# Patient Record
Sex: FEMALE | Race: BLACK OR AFRICAN AMERICAN | NOT HISPANIC OR LATINO | Employment: FULL TIME | URBAN - METROPOLITAN AREA
[De-identification: names, ages, dates, MRNs, and addresses within clinical notes are randomized per-mention and may not be internally consistent; named-entity substitution may affect disease eponyms.]

---

## 2017-05-27 ENCOUNTER — HOSPITAL ENCOUNTER (EMERGENCY)
Facility: OTHER | Age: 27
Discharge: HOME | End: 2017-05-27
Attending: EMERGENCY MEDICINE
Payer: MEDICAID

## 2017-05-27 VITALS
TEMPERATURE: 98 F | RESPIRATION RATE: 16 BRPM | SYSTOLIC BLOOD PRESSURE: 126 MMHG | OXYGEN SATURATION: 97 % | WEIGHT: 270 LBS | BODY MASS INDEX: 43.39 KG/M2 | DIASTOLIC BLOOD PRESSURE: 79 MMHG | HEIGHT: 66 IN | HEART RATE: 98 BPM

## 2017-05-27 DIAGNOSIS — R05.9 COUGH: ICD-10-CM

## 2017-05-27 DIAGNOSIS — R06.02 SOB (SHORTNESS OF BREATH): ICD-10-CM

## 2017-05-27 DIAGNOSIS — J20.9 ACUTE BRONCHITIS, UNSPECIFIED ORGANISM: Primary | ICD-10-CM

## 2017-05-27 LAB
ALBUMIN SERPL BCP-MCNC: 4.1 G/DL
ALP SERPL-CCNC: 78 U/L
ALT SERPL W/O P-5'-P-CCNC: 12 U/L
ANION GAP SERPL CALC-SCNC: 12 MMOL/L
AST SERPL-CCNC: 17 U/L
B-HCG UR QL: NEGATIVE
BASOPHILS # BLD AUTO: 0.02 K/UL
BASOPHILS NFR BLD: 0.2 %
BILIRUB SERPL-MCNC: 0.4 MG/DL
BUN SERPL-MCNC: 13 MG/DL
CALCIUM SERPL-MCNC: 9.6 MG/DL
CHLORIDE SERPL-SCNC: 105 MMOL/L
CO2 SERPL-SCNC: 24 MMOL/L
CREAT SERPL-MCNC: 0.9 MG/DL
CTP QC/QA: YES
D DIMER PPP IA.FEU-MCNC: 0.19 MG/L FEU
DIFFERENTIAL METHOD: ABNORMAL
EOSINOPHIL # BLD AUTO: 0.4 K/UL
EOSINOPHIL NFR BLD: 3.6 %
ERYTHROCYTE [DISTWIDTH] IN BLOOD BY AUTOMATED COUNT: 13.4 %
EST. GFR  (AFRICAN AMERICAN): >60 ML/MIN/1.73 M^2
EST. GFR  (NON AFRICAN AMERICAN): >60 ML/MIN/1.73 M^2
GLUCOSE SERPL-MCNC: 90 MG/DL
HCT VFR BLD AUTO: 39.1 %
HGB BLD-MCNC: 12.9 G/DL
LYMPHOCYTES # BLD AUTO: 2.5 K/UL
LYMPHOCYTES NFR BLD: 21.3 %
MCH RBC QN AUTO: 28.4 PG
MCHC RBC AUTO-ENTMCNC: 33 %
MCV RBC AUTO: 86 FL
MONOCYTES # BLD AUTO: 0.7 K/UL
MONOCYTES NFR BLD: 5.8 %
NEUTROPHILS # BLD AUTO: 7.9 K/UL
NEUTROPHILS NFR BLD: 68.8 %
PLATELET # BLD AUTO: 273 K/UL
PMV BLD AUTO: 10.7 FL
POTASSIUM SERPL-SCNC: 4 MMOL/L
PROT SERPL-MCNC: 7.7 G/DL
RBC # BLD AUTO: 4.55 M/UL
SODIUM SERPL-SCNC: 141 MMOL/L
WBC # BLD AUTO: 11.53 K/UL

## 2017-05-27 PROCEDURE — 25000242 PHARM REV CODE 250 ALT 637 W/ HCPCS: Performed by: PHYSICIAN ASSISTANT

## 2017-05-27 PROCEDURE — 94640 AIRWAY INHALATION TREATMENT: CPT

## 2017-05-27 PROCEDURE — 96374 THER/PROPH/DIAG INJ IV PUSH: CPT

## 2017-05-27 PROCEDURE — 93005 ELECTROCARDIOGRAM TRACING: CPT

## 2017-05-27 PROCEDURE — 25000003 PHARM REV CODE 250: Performed by: PHYSICIAN ASSISTANT

## 2017-05-27 PROCEDURE — 85025 COMPLETE CBC W/AUTO DIFF WBC: CPT

## 2017-05-27 PROCEDURE — 81025 URINE PREGNANCY TEST: CPT | Performed by: EMERGENCY MEDICINE

## 2017-05-27 PROCEDURE — 96372 THER/PROPH/DIAG INJ SC/IM: CPT

## 2017-05-27 PROCEDURE — 63600175 PHARM REV CODE 636 W HCPCS: Performed by: PHYSICIAN ASSISTANT

## 2017-05-27 PROCEDURE — 85379 FIBRIN DEGRADATION QUANT: CPT

## 2017-05-27 PROCEDURE — 80053 COMPREHEN METABOLIC PANEL: CPT

## 2017-05-27 PROCEDURE — 93010 ELECTROCARDIOGRAM REPORT: CPT | Mod: ,,, | Performed by: INTERNAL MEDICINE

## 2017-05-27 PROCEDURE — 96361 HYDRATE IV INFUSION ADD-ON: CPT

## 2017-05-27 PROCEDURE — 99284 EMERGENCY DEPT VISIT MOD MDM: CPT | Mod: 25

## 2017-05-27 RX ORDER — SODIUM CHLORIDE 9 MG/ML
1000 INJECTION, SOLUTION INTRAVENOUS
Status: COMPLETED | OUTPATIENT
Start: 2017-05-27 | End: 2017-05-27

## 2017-05-27 RX ORDER — IPRATROPIUM BROMIDE AND ALBUTEROL SULFATE 2.5; .5 MG/3ML; MG/3ML
3 SOLUTION RESPIRATORY (INHALATION)
Status: COMPLETED | OUTPATIENT
Start: 2017-05-27 | End: 2017-05-27

## 2017-05-27 RX ORDER — KETOROLAC TROMETHAMINE 30 MG/ML
15 INJECTION, SOLUTION INTRAMUSCULAR; INTRAVENOUS
Status: COMPLETED | OUTPATIENT
Start: 2017-05-27 | End: 2017-05-27

## 2017-05-27 RX ORDER — DEXAMETHASONE SODIUM PHOSPHATE 4 MG/ML
8 INJECTION, SOLUTION INTRA-ARTICULAR; INTRALESIONAL; INTRAMUSCULAR; INTRAVENOUS; SOFT TISSUE
Status: COMPLETED | OUTPATIENT
Start: 2017-05-27 | End: 2017-05-27

## 2017-05-27 RX ORDER — ALBUTEROL SULFATE 90 UG/1
1-2 AEROSOL, METERED RESPIRATORY (INHALATION) EVERY 6 HOURS PRN
Qty: 1 INHALER | Refills: 0 | Status: SHIPPED | OUTPATIENT
Start: 2017-05-27 | End: 2018-05-27

## 2017-05-27 RX ADMIN — IPRATROPIUM BROMIDE AND ALBUTEROL SULFATE 3 ML: .5; 3 SOLUTION RESPIRATORY (INHALATION) at 07:05

## 2017-05-27 RX ADMIN — SODIUM CHLORIDE 1000 ML: 0.9 INJECTION, SOLUTION INTRAVENOUS at 08:05

## 2017-05-27 RX ADMIN — KETOROLAC TROMETHAMINE 15 MG: 30 INJECTION, SOLUTION INTRAMUSCULAR at 08:05

## 2017-05-27 RX ADMIN — SODIUM CHLORIDE 1000 ML: 0.9 INJECTION, SOLUTION INTRAVENOUS at 05:05

## 2017-05-27 RX ADMIN — DEXAMETHASONE SODIUM PHOSPHATE 8 MG: 4 INJECTION, SOLUTION INTRAMUSCULAR; INTRAVENOUS at 07:05

## 2017-05-27 NOTE — ED TRIAGE NOTES
pt reports with c/o SOB for the past 2 days. pt with back pain, cough and chest wall pain with coughing.reports irritation to thoat

## 2017-05-27 NOTE — ED PROVIDER NOTES
Encounter Date: 5/27/2017       History     Chief Complaint   Patient presents with    Shortness of Breath     pt states sob x 2 days. pt with back pain cough and chest wall pain with coughing.     Review of patient's allergies indicates:  No Known Allergies  Patient is 26-year-old female who presents with complaints of shortness of breath and chest tightness that started yesterday and persisted into today.  She reports associated cough without report of nasal congestion or sore throat. She denies fever or chills. She has no report of sick contacts or recent travel. She reports SOB is worse on exertion and with coughing spells. She reports feeling chest tightness with each breath and after coughing. She denies history of asthma. She has not been taking any medications at home to help with symptoms. She is currently unaccompanied in the ER.       The history is provided by the patient.     Past Medical History:   Diagnosis Date    Anemia     Complication of anesthesia     one sided epidural     Past Surgical History:   Procedure Laterality Date    TUBAL LIGATION       Family History   Problem Relation Age of Onset    Hypertension Maternal Grandmother     Hyperlipidemia Maternal Grandmother     Breast cancer Maternal Grandmother      dx in age - unknown    Hypertension Mother     Asthma Sister     Asthma Son     Heart disease Maternal Aunt     Cancer Maternal Uncle     Colon cancer Maternal Uncle     Ovarian cancer Neg Hx      Social History   Substance Use Topics    Smoking status: Never Smoker    Smokeless tobacco: Never Used    Alcohol use 1.2 oz/week     2 Shots of liquor per week      Comment: occasional     Review of Systems   Constitutional: Negative for fever.   HENT: Negative for sore throat.    Respiratory: Positive for cough, chest tightness and shortness of breath.    Cardiovascular: Negative for chest pain.   Gastrointestinal: Negative for nausea.   Genitourinary: Negative for dysuria.    Musculoskeletal: Negative for back pain.   Skin: Negative for rash.   Neurological: Negative for weakness.   Hematological: Does not bruise/bleed easily.       Physical Exam     Initial Vitals [05/27/17 1654]   BP Pulse Resp Temp SpO2   135/85 107 20 97.9 °F (36.6 °C) 96 %     Physical Exam    Nursing note and vitals reviewed.  Constitutional: She appears well-developed and well-nourished. She is not diaphoretic. No distress.   Healthy appearing -American female in no acute distress or obvious pain.  She makes good eye contact, speaks in clear full sentences and ambulates with ease.   HENT:   Head: Normocephalic and atraumatic.   Eyes: Conjunctivae and EOM are normal. Pupils are equal, round, and reactive to light. Right eye exhibits no discharge. Left eye exhibits no discharge. No scleral icterus.   Neck: Normal range of motion. Neck supple.   Cardiovascular: Normal rate, regular rhythm and normal heart sounds.   Pulmonary/Chest: No respiratory distress. She has no wheezes. She has no rhonchi. She has no rales.   Diminished breath sounds bilaterally thought to be related to body habitus.  No audible wheezing rales or rhonchi.   Abdominal: Soft. Bowel sounds are normal. There is no tenderness. There is no rebound and no guarding.   Musculoskeletal: Normal range of motion. She exhibits no edema or tenderness.   Lymphadenopathy:     She has no cervical adenopathy.   Neurological: She is alert and oriented to person, place, and time. She has normal strength. No cranial nerve deficit or sensory deficit.   Skin: Skin is warm and dry. Capillary refill takes less than 2 seconds. No rash and no abscess noted. No erythema.   Psychiatric: She has a normal mood and affect. Her behavior is normal. Thought content normal.         ED Course   Procedures  Labs Reviewed   CBC W/ AUTO DIFFERENTIAL   COMPREHENSIVE METABOLIC PANEL   D DIMER, QUANTITATIVE     EKG Readings: (Independently Interpreted)   Initial Reading: No  STEMI. Rhythm: Normal Sinus Rhythm. Heart Rate: 100. Ectopy: No Ectopy. ST Segments: Normal ST Segments. T Waves: Normal. Axis: Normal.      Imaging Results          X-Ray Chest PA And Lateral (Final result)  Result time 05/27/17 19:13:05    Final result by Jac Holt MD (05/27/17 19:13:05)                 Impression:        No acute cardiothoracic disease evident.       Electronically signed by: Dr. Jac Holt MD  Date:     05/27/17  Time:    19:13              Narrative:    TWO VIEW CHEST:     Comparison: None.    Findings:    The cardiac silhouette and the pulmonary vasculature are normal in size.  The mediastinal and hilar contours are unremarkable.  There is no pneumothorax.  No pleural effusion.  The lungs are clear.  No acute bony abnormality.                            Labs Reviewed   CBC W/ AUTO DIFFERENTIAL - Abnormal; Notable for the following:        Result Value    Gran # 7.9 (*)     All other components within normal limits   COMPREHENSIVE METABOLIC PANEL   D DIMER, QUANTITATIVE   POCT URINE PREGNANCY            Medical Decision Making:   ED Management:  Urgent evaluation of 26-year-old female who presents with complaints of shortness of breath without clear etiology to concerning for pulmonary embolism given Tachycardia.  She is afebrile, nontoxic appearing and tachycardic at 107. Physical exam reveals no adventitious breath sounds however she does have diminished breath sounds diffusely thought to be related to body habitus.  No evidence of HEENT inflammation or bacterial infection.  Chest x-ray pending.  Diagnostic labs including d-dimer pending.  We'll continue to monitor.    UPDATE: patient has no ischemic changes on EKG.  D-dimer is negative.  Chest x-ray is unremarkable.diagnostic labs unremarkable.  Duo neb improves patient's symptoms.  Will discharge with  Albuterol inhaler and encouraged her to follow-up with primary care provider in 1-2 days for symptom recheck.  She verbalizes  understanding is amenable to plan.  Case discussed with attending agrees with plan.    Patient has delay of discharge due to tachycardia on ambulation thought to be a result of duo nebs. She is give a second Liter of NS and has significant improvement in VS with no reported SOB. She is felt safe for discharge as planned. She is amenable.   Other:   I have discussed this case with another health care provider.       <> Summary of the Discussion: [facility]                   ED Course     Clinical Impression:   The primary encounter diagnosis was Acute bronchitis, unspecified organism. Diagnoses of SOB (shortness of breath) and Cough were also pertinent to this visit.          Kenia Wright PA-C  05/27/17 2001       Kenia Wright PA-C  05/27/17 6876

## 2017-05-28 NOTE — ED NOTES
Pt rounding complete. Pain 4/10.  Restroom and comfort needs addressed. Pt updated on plan of care. Will continue to monitor.

## 2017-07-17 ENCOUNTER — HOSPITAL ENCOUNTER (EMERGENCY)
Facility: HOSPITAL | Age: 27
Discharge: HOME | End: 2017-07-17
Attending: EMERGENCY MEDICINE
Payer: MEDICAID

## 2017-07-17 VITALS
RESPIRATION RATE: 16 BRPM | HEART RATE: 80 BPM | BODY MASS INDEX: 43.07 KG/M2 | TEMPERATURE: 98 F | WEIGHT: 268 LBS | DIASTOLIC BLOOD PRESSURE: 72 MMHG | OXYGEN SATURATION: 100 % | HEIGHT: 66 IN | SYSTOLIC BLOOD PRESSURE: 128 MMHG

## 2017-07-17 DIAGNOSIS — R06.09 DOE (DYSPNEA ON EXERTION): ICD-10-CM

## 2017-07-17 DIAGNOSIS — R07.9 CHEST PAIN: ICD-10-CM

## 2017-07-17 DIAGNOSIS — R07.89 CHEST TIGHTNESS: Primary | ICD-10-CM

## 2017-07-17 LAB
ALBUMIN SERPL BCP-MCNC: 4.3 G/DL
ALP SERPL-CCNC: 89 U/L
ALT SERPL W/O P-5'-P-CCNC: 12 U/L
ANION GAP SERPL CALC-SCNC: 10 MMOL/L
AST SERPL-CCNC: 22 U/L
B-HCG UR QL: NEGATIVE
BASOPHILS # BLD AUTO: 0.02 K/UL
BASOPHILS NFR BLD: 0.2 %
BILIRUB SERPL-MCNC: 0.6 MG/DL
BNP SERPL-MCNC: <10 PG/ML
BUN SERPL-MCNC: 12 MG/DL
CALCIUM SERPL-MCNC: 8.7 MG/DL
CHLORIDE SERPL-SCNC: 108 MMOL/L
CO2 SERPL-SCNC: 22 MMOL/L
CREAT SERPL-MCNC: 0.9 MG/DL
CTP QC/QA: YES
DIFFERENTIAL METHOD: NORMAL
EOSINOPHIL # BLD AUTO: 0.2 K/UL
EOSINOPHIL NFR BLD: 2.1 %
ERYTHROCYTE [DISTWIDTH] IN BLOOD BY AUTOMATED COUNT: 13.6 %
EST. GFR  (AFRICAN AMERICAN): >60 ML/MIN/1.73 M^2
EST. GFR  (NON AFRICAN AMERICAN): >60 ML/MIN/1.73 M^2
GLUCOSE SERPL-MCNC: 85 MG/DL
HCT VFR BLD AUTO: 40.2 %
HGB BLD-MCNC: 13.8 G/DL
LYMPHOCYTES # BLD AUTO: 3 K/UL
LYMPHOCYTES NFR BLD: 26.1 %
MCH RBC QN AUTO: 29.6 PG
MCHC RBC AUTO-ENTMCNC: 34.3 %
MCV RBC AUTO: 86 FL
MONOCYTES # BLD AUTO: 0.8 K/UL
MONOCYTES NFR BLD: 7 %
NEUTROPHILS # BLD AUTO: 7.3 K/UL
NEUTROPHILS NFR BLD: 64.4 %
PLATELET # BLD AUTO: 270 K/UL
PMV BLD AUTO: 10.2 FL
POTASSIUM SERPL-SCNC: 3.7 MMOL/L
PROT SERPL-MCNC: 8.1 G/DL
RBC # BLD AUTO: 4.66 M/UL
SODIUM SERPL-SCNC: 140 MMOL/L
TROPONIN I SERPL DL<=0.01 NG/ML-MCNC: <0.006 NG/ML
WBC # BLD AUTO: 11.38 K/UL

## 2017-07-17 PROCEDURE — 83880 ASSAY OF NATRIURETIC PEPTIDE: CPT

## 2017-07-17 PROCEDURE — 93005 ELECTROCARDIOGRAM TRACING: CPT

## 2017-07-17 PROCEDURE — 99284 EMERGENCY DEPT VISIT MOD MDM: CPT | Mod: 25

## 2017-07-17 PROCEDURE — 84484 ASSAY OF TROPONIN QUANT: CPT

## 2017-07-17 PROCEDURE — 99284 EMERGENCY DEPT VISIT MOD MDM: CPT | Mod: ,,, | Performed by: PHYSICIAN ASSISTANT

## 2017-07-17 PROCEDURE — 94760 N-INVAS EAR/PLS OXIMETRY 1: CPT

## 2017-07-17 PROCEDURE — 94640 AIRWAY INHALATION TREATMENT: CPT

## 2017-07-17 PROCEDURE — 93010 ELECTROCARDIOGRAM REPORT: CPT | Mod: ,,, | Performed by: INTERNAL MEDICINE

## 2017-07-17 PROCEDURE — 85025 COMPLETE CBC W/AUTO DIFF WBC: CPT

## 2017-07-17 PROCEDURE — 80053 COMPREHEN METABOLIC PANEL: CPT

## 2017-07-17 PROCEDURE — 25000242 PHARM REV CODE 250 ALT 637 W/ HCPCS: Performed by: PHYSICIAN ASSISTANT

## 2017-07-17 PROCEDURE — 81025 URINE PREGNANCY TEST: CPT | Performed by: PHYSICIAN ASSISTANT

## 2017-07-17 RX ORDER — IPRATROPIUM BROMIDE AND ALBUTEROL SULFATE 2.5; .5 MG/3ML; MG/3ML
3 SOLUTION RESPIRATORY (INHALATION)
Status: COMPLETED | OUTPATIENT
Start: 2017-07-17 | End: 2017-07-17

## 2017-07-17 RX ADMIN — IPRATROPIUM BROMIDE AND ALBUTEROL SULFATE 3 ML: .5; 3 SOLUTION RESPIRATORY (INHALATION) at 09:07

## 2017-07-17 NOTE — ED PROVIDER NOTES
Encounter Date: 7/17/2017    SCRIBE #1 NOTE: I, Laure Bueno, am scribing for, and in the presence of, Yaritza Carr PA-C.   SCRIBE #2 NOTE: I, Adama Sam, am scribing for, and in the presence of,  Dr. Cloud. I have scribed the following portions of the note - the APC attestation.     History     Chief Complaint   Patient presents with    Multiple Complaints     chest pain, back pain sob, just came on while working, denies cardiac hx,had cold 2 days ago     Time seen by provider: 6:33 PM    This is a 26 y.o. female with a PMHx of anemia with who presents with complaint of intermittent left sided parasternal chest pain/tightness for 2 days. Pain began at work (pt works at [employer]). This pain is worse with coughing and deep breaths. The patient also describes associated mid upper back tightness which onset around the same time. She also complains of 3 months of dyspnea on exertion and orthopnea which inhalers have not improved. She was diagnosed with bronchitis about a month ago but has not had improvement with treatment. She also has 2 weeks of lower extremity pain and ankle and feet swelling. She notices this most when she has been on her feet for a while and mostly resolves with elevation. The patient denies being on hormonal birthcontrol and has no history of blood clots. The patient also endorses that she is just getting over a cold and had one episode of vomiting last night. She denies any fever, chills, diarrhea, abdominal pain, and dysuria.       The history is provided by the patient.     Review of patient's allergies indicates:  No Known Allergies  Past Medical History:   Diagnosis Date    Anemia     Complication of anesthesia     one sided epidural     Past Surgical History:   Procedure Laterality Date    TUBAL LIGATION       Family History   Problem Relation Age of Onset    Hypertension Maternal Grandmother     Hyperlipidemia Maternal Grandmother     Breast cancer Maternal Grandmother      dx in  age - unknown    Hypertension Mother     Asthma Sister     Asthma Son     Heart disease Maternal Aunt     Cancer Maternal Uncle     Colon cancer Maternal Uncle     Ovarian cancer Neg Hx      Social History   Substance Use Topics    Smoking status: Never Smoker    Smokeless tobacco: Never Used    Alcohol use 1.2 oz/week     2 Shots of liquor per week      Comment: occasional     Review of Systems   Constitutional: Negative for chills and fever.   HENT: Negative for facial swelling and nosebleeds.    Eyes: Negative for visual disturbance.   Respiratory: Positive for chest tightness and shortness of breath.    Cardiovascular: Positive for chest pain. Negative for palpitations.   Gastrointestinal: Positive for vomiting. Negative for abdominal distention, abdominal pain and diarrhea.   Genitourinary: Negative for difficulty urinating, dysuria, frequency and hematuria.   Musculoskeletal: Positive for back pain, joint swelling and myalgias. Negative for neck pain and neck stiffness.   Skin: Negative for rash.   Neurological: Negative for seizures, syncope and speech difficulty.       Physical Exam     Initial Vitals [07/17/17 1746]   BP Pulse Resp Temp SpO2   125/75 95 18 98 °F (36.7 °C) 96 %      MAP       91.67         Physical Exam    Nursing note and vitals reviewed.  Constitutional: She appears well-developed and well-nourished. She is not diaphoretic. She is Obese .  Non-toxic appearance. She does not appear ill. No distress.   HENT:   Head: Normocephalic and atraumatic.   Neck: Neck supple.   Cardiovascular: Normal rate and regular rhythm. Exam reveals no gallop and no friction rub.    No murmur heard.  No lower extremity edema, no reproducible chest tenderness.    Pulmonary/Chest: Effort normal. No accessory muscle usage. No tachypnea. No respiratory distress. She has decreased breath sounds (throughout). She has no wheezes. She has no rhonchi. She has no rales.   Abdominal: Soft. Normal appearance and  bowel sounds are normal. She exhibits no distension. There is no tenderness.   Musculoskeletal: Normal range of motion.   Neurological: She is alert and oriented to person, place, and time.   Skin: Skin is warm and dry. No rash noted. No pallor.   Psychiatric: She has a normal mood and affect. Her behavior is normal. Judgment and thought content normal.         ED Course   Procedures  Labs Reviewed   COMPREHENSIVE METABOLIC PANEL - Abnormal; Notable for the following:        Result Value    CO2 22 (*)     All other components within normal limits   CBC W/ AUTO DIFFERENTIAL   TROPONIN I   B-TYPE NATRIURETIC PEPTIDE   POCT URINE PREGNANCY             Medical Decision Making:   History:   Old Medical Records: I decided to obtain old medical records.  Differential Diagnosis:   My initial differential diagnoses include, but are not limited to: pneumonia, ACS, CHF, pulmonary embolism, GERD.   Clinical Tests:   Lab Tests: Ordered and Reviewed  Radiological Study: Ordered and Reviewed  Medical Tests: Ordered and Reviewed       APC / Resident Notes:   26 y.o. female present for evaluation of chest tightness, dyspnea on exertion, ble edema. Vitals are within normal limits. Lungs have decreased breath sounds throughout. No peripheral edema. Cardiac exam unremarkable.     workup including cardiac enzymes, CXR, and EKG are all unremarkable for acute process.     Will discharge patient is stable condition with a respiratory spacer for her home albuterol. PCP follow up in 3-4 days for reevaluation.     ED warnings and return precautions given.  I have reviewed the patient's records and discussed this case with my supervising physician.        Scribe Attestation:   Scribe #1: I performed the above scribed service and the documentation accurately describes the services I performed. I attest to the accuracy of the note.  Scribe #2: I performed the above scribed service and the documentation accurately describes the services I  performed. I attest to the accuracy of the note.    Attending Attestation:     Physician Attestation Statement for NP/PA:   I discussed this assessment and plan of this patient with the NP/PA, but I did not personally examine the patient. The face to face encounter was performed by the NP/PA.    Other NP/PA Attestation Additions:    History of Present Illness: Shortness of breath and chest tightness          Physician Attestation for Scribe:  Physician Attestation Statement for Scribe #1: I, Yaritza Carr PA-C, reviewed documentation, as scribed by Laure Bueno in my presence, and it is both accurate and complete.   Physician Attestation Statement for Scribe #2: I, Dr. Cloud , reviewed documentation, as scribed by Adama Sam in my presence, and it is both accurate and complete. I also acknowledge and confirm the content of the note done by Ada #1.              ED Course     Clinical Impression:   The primary encounter diagnosis was Chest tightness. Diagnoses of Chest pain and BROWN (dyspnea on exertion) were also pertinent to this visit.    Disposition:   Disposition: Discharged  Condition: Stable                        Yaritza Carr PA-C  07/18/17 0229

## 2017-07-17 NOTE — ED TRIAGE NOTES
"Pt c/o chest pain onset x2 days ago, located to center of chest, describes as "intermittent tightness", denies at this time. Pt also c/o SOB onset today and mid upper back pain onset approx x3 days ago. Rates pain 4/10 at this time. Denies N/V. Pt also c/o bilateral lower extremity swelling, intermittently, x2-3 weeks.    "

## 2017-07-17 NOTE — PROVIDER PROGRESS NOTES - EMERGENCY DEPT.
Encounter Date: 7/17/2017    ED Physician Progress Notes         EKG - STEMI Decision  Initial Reading: No STEMI present.  Response: No Action Needed.

## 2017-08-07 ENCOUNTER — HOSPITAL ENCOUNTER (EMERGENCY)
Facility: HOSPITAL | Age: 27
Discharge: HOME | End: 2017-08-07
Attending: EMERGENCY MEDICINE | Admitting: EMERGENCY MEDICINE
Payer: MEDICAID

## 2017-08-07 VITALS
HEART RATE: 83 BPM | BODY MASS INDEX: 44.68 KG/M2 | RESPIRATION RATE: 16 BRPM | DIASTOLIC BLOOD PRESSURE: 71 MMHG | WEIGHT: 278 LBS | SYSTOLIC BLOOD PRESSURE: 122 MMHG | OXYGEN SATURATION: 100 % | TEMPERATURE: 99 F | HEIGHT: 66 IN

## 2017-08-07 DIAGNOSIS — M25.561 ACUTE PAIN OF RIGHT KNEE: Primary | ICD-10-CM

## 2017-08-07 LAB
B-HCG UR QL: NEGATIVE
CTP QC/QA: YES

## 2017-08-07 PROCEDURE — 81025 URINE PREGNANCY TEST: CPT | Performed by: EMERGENCY MEDICINE

## 2017-08-07 PROCEDURE — 99282 EMERGENCY DEPT VISIT SF MDM: CPT | Mod: ,,, | Performed by: PHYSICIAN ASSISTANT

## 2017-08-07 PROCEDURE — 99283 EMERGENCY DEPT VISIT LOW MDM: CPT

## 2017-08-07 RX ORDER — IBUPROFEN 600 MG/1
600 TABLET ORAL EVERY 6 HOURS PRN
Qty: 20 TABLET | Refills: 0 | OUTPATIENT
Start: 2017-08-07 | End: 2019-04-14

## 2017-08-07 RX ORDER — FLUTICASONE FUROATE AND VILANTEROL 200; 25 UG/1; UG/1
1 POWDER RESPIRATORY (INHALATION) DAILY
COMMUNITY
End: 2021-11-16 | Stop reason: ALTCHOICE

## 2017-08-07 NOTE — ED PROVIDER NOTES
Encounter Date: 8/7/2017    SCRIBE #1 NOTE: IDylan, am scribing for, and in the presence of, Yaritza Carr PA-C.       History     Chief Complaint   Patient presents with    Knee Pain     bad pain to r knee, hx ligament tear     Pt is a 26 y.o. female who has a pmhx of anemia; arthritis; is presenting to ED for evaluation of right knee pain that began two days ago.  She also reports some swelling to the right knee.  Patient denies injury.  Pt has not attempted to treat her pain PTA. She reports a tear in right knee ligament one year ago.       The history is provided by the patient.     Review of patient's allergies indicates:  No Known Allergies  Past Medical History:   Diagnosis Date    Anemia     Arthritis     Complication of anesthesia     one sided epidural     Past Surgical History:   Procedure Laterality Date    TUBAL LIGATION       Family History   Problem Relation Age of Onset    Hypertension Maternal Grandmother     Hyperlipidemia Maternal Grandmother     Breast cancer Maternal Grandmother      dx in age - unknown    Hypertension Mother     Asthma Sister     Asthma Son     Heart disease Maternal Aunt     Cancer Maternal Uncle     Colon cancer Maternal Uncle     Ovarian cancer Neg Hx      Social History   Substance Use Topics    Smoking status: Never Smoker    Smokeless tobacco: Never Used    Alcohol use 1.2 oz/week     2 Shots of liquor per week      Comment: occasional     Review of Systems   Constitutional: Negative for fever.   HENT: Negative for sore throat.    Respiratory: Negative for shortness of breath.    Cardiovascular: Negative for chest pain.   Gastrointestinal: Negative for nausea.   Genitourinary: Negative for dysuria.   Musculoskeletal: Positive for arthralgias (R knee). Negative for back pain and myalgias.   Skin: Negative for rash.   Neurological: Negative for weakness.   Hematological: Does not bruise/bleed easily.       Physical Exam     Initial Vitals [08/07/17  1035]   BP Pulse Resp Temp SpO2   122/71 83 16 98.6 °F (37 °C) 100 %      MAP       88         Physical Exam    Nursing note and vitals reviewed.  Constitutional: She appears well-developed and well-nourished. She is not diaphoretic. She is Obese .  Non-toxic appearance. She does not appear ill. No distress.   HENT:   Head: Normocephalic and atraumatic.   Neck: Neck supple.   Cardiovascular: Normal rate and regular rhythm. Exam reveals no gallop and no friction rub.    No murmur heard.  Pulses:       Dorsalis pedis pulses are 2+ on the left side.   Pulmonary/Chest: Effort normal and breath sounds normal. No accessory muscle usage. No tachypnea. No respiratory distress. She has no decreased breath sounds. She has no wheezes. She has no rhonchi. She has no rales.   Abdominal: Normal appearance. She exhibits no distension.   Musculoskeletal: Normal range of motion.   No significant swelling noted to right knee. No joint laxity. Normal passive and active ROM. No significant tenderness.    Neurological: She is alert and oriented to person, place, and time.   Normal sensations to RLE.   Skin: Skin is warm and dry. No rash noted. No pallor.   No warmth or erythema noted.    Psychiatric: She has a normal mood and affect. Her behavior is normal. Judgment and thought content normal.         ED Course   Procedures  Labs Reviewed   POCT URINE PREGNANCY             Medical Decision Making:   Differential Diagnosis:   My differential diagnosis includes but is not limited to; septic joint, gout, arthritis, and baker's cyst.        APC / Resident Notes:   This is an emergent evaluation of an 26 y.o. female presenting to ED for evaluation of right knee pain x2 days. Vitals are normal.  Physical exam findings as above.       I do not feel that imaging is indicated at this time. I will discharge with NSAIDs and instructions for RICE therapy. PCP follow in 3-4 days if no improvement. ED warnings and return precautions given. I have  reviewed pt's records and discussed with supervising physician.        Scribe Attestation:   Scribe #1: I performed the above scribed service and the documentation accurately describes the services I performed. I attest to the accuracy of the note.    Attending Attestation:           Physician Attestation for Scribe:  Physician Attestation Statement for Scribe #1: I, Yaritza Carr PA-C, reviewed documentation, as scribed by Dylan Logan in my presence, and it is both accurate and complete.                 ED Course     Clinical Impression:     1. Acute pain of right knee          Disposition:   Disposition: Discharged  Condition: Stable                        Yaritza Carr PA-C  08/07/17 3295

## 2017-08-07 NOTE — ED TRIAGE NOTES
Pt c/o right knee pain onset yesterday - rates pain 6/10 at this time - denies recent injury - reports hx of ligament tear to knee. Denies numbness or tingling at this time.

## 2018-05-11 ENCOUNTER — HOSPITAL ENCOUNTER (EMERGENCY)
Facility: HOSPITAL | Age: 28
Discharge: HOME | End: 2018-05-11
Attending: EMERGENCY MEDICINE
Payer: MEDICAID

## 2018-05-11 VITALS
OXYGEN SATURATION: 100 % | TEMPERATURE: 98 F | HEART RATE: 80 BPM | HEIGHT: 66 IN | WEIGHT: 279 LBS | DIASTOLIC BLOOD PRESSURE: 80 MMHG | BODY MASS INDEX: 44.84 KG/M2 | RESPIRATION RATE: 18 BRPM | SYSTOLIC BLOOD PRESSURE: 140 MMHG

## 2018-05-11 DIAGNOSIS — K52.9 GASTROENTERITIS: Primary | ICD-10-CM

## 2018-05-11 LAB
ALBUMIN SERPL BCP-MCNC: 4.1 G/DL
ALP SERPL-CCNC: 81 U/L
ALT SERPL W/O P-5'-P-CCNC: 11 U/L
ANION GAP SERPL CALC-SCNC: 11 MMOL/L
AST SERPL-CCNC: 16 U/L
BACTERIA #/AREA URNS AUTO: NORMAL /HPF
BASOPHILS # BLD AUTO: 0.04 K/UL
BASOPHILS NFR BLD: 0.4 %
BILIRUB SERPL-MCNC: 0.6 MG/DL
BILIRUB UR QL STRIP: NEGATIVE
BUN SERPL-MCNC: 11 MG/DL
CALCIUM SERPL-MCNC: 9 MG/DL
CHLORIDE SERPL-SCNC: 108 MMOL/L
CLARITY UR REFRACT.AUTO: CLEAR
CO2 SERPL-SCNC: 23 MMOL/L
COLOR UR AUTO: YELLOW
CREAT SERPL-MCNC: 0.8 MG/DL
DIFFERENTIAL METHOD: ABNORMAL
EOSINOPHIL # BLD AUTO: 0.3 K/UL
EOSINOPHIL NFR BLD: 2.4 %
ERYTHROCYTE [DISTWIDTH] IN BLOOD BY AUTOMATED COUNT: 13 %
EST. GFR  (AFRICAN AMERICAN): >60 ML/MIN/1.73 M^2
EST. GFR  (NON AFRICAN AMERICAN): >60 ML/MIN/1.73 M^2
GLUCOSE SERPL-MCNC: 100 MG/DL
GLUCOSE UR QL STRIP: NEGATIVE
HCT VFR BLD AUTO: 37.5 %
HGB BLD-MCNC: 12.7 G/DL
HGB UR QL STRIP: NEGATIVE
HYALINE CASTS UR QL AUTO: 0 /LPF
IMM GRANULOCYTES # BLD AUTO: 0.06 K/UL
IMM GRANULOCYTES NFR BLD AUTO: 0.5 %
KETONES UR QL STRIP: NEGATIVE
LEUKOCYTE ESTERASE UR QL STRIP: NEGATIVE
LIPASE SERPL-CCNC: 12 U/L
LYMPHOCYTES # BLD AUTO: 1.5 K/UL
LYMPHOCYTES NFR BLD: 13.6 %
MCH RBC QN AUTO: 29.5 PG
MCHC RBC AUTO-ENTMCNC: 33.9 G/DL
MCV RBC AUTO: 87 FL
MICROSCOPIC COMMENT: NORMAL
MONOCYTES # BLD AUTO: 0.7 K/UL
MONOCYTES NFR BLD: 6.3 %
NEUTROPHILS # BLD AUTO: 8.4 K/UL
NEUTROPHILS NFR BLD: 76.8 %
NITRITE UR QL STRIP: NEGATIVE
NRBC BLD-RTO: 0 /100 WBC
PH UR STRIP: 6 [PH] (ref 5–8)
PLATELET # BLD AUTO: 242 K/UL
PMV BLD AUTO: 11.1 FL
POTASSIUM SERPL-SCNC: 4.1 MMOL/L
PROT SERPL-MCNC: 7.9 G/DL
PROT UR QL STRIP: ABNORMAL
RBC # BLD AUTO: 4.3 M/UL
RBC #/AREA URNS AUTO: 1 /HPF (ref 0–4)
SODIUM SERPL-SCNC: 142 MMOL/L
SP GR UR STRIP: 1.02 (ref 1–1.03)
SQUAMOUS #/AREA URNS AUTO: 3 /HPF
URN SPEC COLLECT METH UR: ABNORMAL
UROBILINOGEN UR STRIP-ACNC: NEGATIVE EU/DL
WBC # BLD AUTO: 10.95 K/UL
WBC #/AREA URNS AUTO: 2 /HPF (ref 0–5)

## 2018-05-11 PROCEDURE — 96365 THER/PROPH/DIAG IV INF INIT: CPT | Mod: 59

## 2018-05-11 PROCEDURE — 96375 TX/PRO/DX INJ NEW DRUG ADDON: CPT

## 2018-05-11 PROCEDURE — 99283 EMERGENCY DEPT VISIT LOW MDM: CPT | Mod: ,,, | Performed by: EMERGENCY MEDICINE

## 2018-05-11 PROCEDURE — 96361 HYDRATE IV INFUSION ADD-ON: CPT

## 2018-05-11 PROCEDURE — 85025 COMPLETE CBC W/AUTO DIFF WBC: CPT

## 2018-05-11 PROCEDURE — 63600175 PHARM REV CODE 636 W HCPCS: Performed by: PHYSICIAN ASSISTANT

## 2018-05-11 PROCEDURE — 99284 EMERGENCY DEPT VISIT MOD MDM: CPT | Mod: 25

## 2018-05-11 PROCEDURE — 25000003 PHARM REV CODE 250: Performed by: PHYSICIAN ASSISTANT

## 2018-05-11 PROCEDURE — 81001 URINALYSIS AUTO W/SCOPE: CPT

## 2018-05-11 PROCEDURE — 80053 COMPREHEN METABOLIC PANEL: CPT

## 2018-05-11 PROCEDURE — 96372 THER/PROPH/DIAG INJ SC/IM: CPT | Mod: 59

## 2018-05-11 PROCEDURE — 25500020 PHARM REV CODE 255: Performed by: EMERGENCY MEDICINE

## 2018-05-11 PROCEDURE — 83690 ASSAY OF LIPASE: CPT

## 2018-05-11 RX ORDER — DICYCLOMINE HYDROCHLORIDE 10 MG/ML
20 INJECTION INTRAMUSCULAR
Status: COMPLETED | OUTPATIENT
Start: 2018-05-11 | End: 2018-05-11

## 2018-05-11 RX ORDER — METOCLOPRAMIDE HYDROCHLORIDE 5 MG/ML
10 INJECTION INTRAMUSCULAR; INTRAVENOUS
Status: COMPLETED | OUTPATIENT
Start: 2018-05-11 | End: 2018-05-11

## 2018-05-11 RX ORDER — PROMETHAZINE HYDROCHLORIDE 25 MG/1
25 TABLET ORAL EVERY 6 HOURS PRN
Qty: 10 TABLET | Refills: 0 | Status: SHIPPED | OUTPATIENT
Start: 2018-05-11 | End: 2021-01-19

## 2018-05-11 RX ADMIN — SODIUM CHLORIDE 1000 ML: 0.9 INJECTION, SOLUTION INTRAVENOUS at 05:05

## 2018-05-11 RX ADMIN — METOCLOPRAMIDE 10 MG: 5 INJECTION, SOLUTION INTRAMUSCULAR; INTRAVENOUS at 05:05

## 2018-05-11 RX ADMIN — IOHEXOL 100 ML: 350 INJECTION, SOLUTION INTRAVENOUS at 08:05

## 2018-05-11 RX ADMIN — PROMETHAZINE HYDROCHLORIDE 12.5 MG: 25 INJECTION INTRAMUSCULAR; INTRAVENOUS at 07:05

## 2018-05-11 RX ADMIN — DICYCLOMINE HYDROCHLORIDE 20 MG: 20 INJECTION, SOLUTION INTRAMUSCULAR at 05:05

## 2018-05-11 NOTE — ED NOTES
"Pt identifiers checked and accurate with Rachael Moeller.     Pt reports emesis beginning today. Pt reports abdominal pain with cramps, fatigue and nausea. Pt reports waking up this morning "feeling like I caught a cold". Pt denies dizziness, fever, diarrhea, CP and SOB.     LOC: The patient is awake, alert and aware of environment with an appropriate affect, the patient is oriented x 3 and speaking appropriately.  APPEARANCE: Patient resting comfortably and in no acute distress, patient is clean and well groomed  SKIN: The skin is warm and dry, color consistent with ethnicity, patient has normal skin turgor and moist mucus membranes  RESPIRATORY: Airway is open and patent, breath sounds clear throughout all lung fields; Pt denies SOB  ABDOMEN: Soft and non tender to palpation, no distention noted. Bowel sounds present x 4  NEUROLOGIC: PERRL, 3 mm bilaterally, eyes open spontaneously, behavior appropriate to situation, follows commands    "

## 2018-05-11 NOTE — ED PROVIDER NOTES
Encounter Date: 5/11/2018       History     Chief Complaint   Patient presents with    Emesis     upper R abd     27-year-old female with no relevant medical history presents for nausea and vomiting since waking this morning.  Reports associated intermittent abdominal pain on her left and right sides lasting 2-3 minutes then abating.  Also reporting diarrhea and chills without fever.  Denies bloody or dark stool, chest pain, shortness of breath, urinary symptoms, vaginal discharge, back pain. Denies any new foods, sick contacts, international travel.          Review of patient's allergies indicates:  No Known Allergies  Past Medical History:   Diagnosis Date    Anemia     Arthritis     Complication of anesthesia     one sided epidural     Past Surgical History:   Procedure Laterality Date    TUBAL LIGATION       Family History   Problem Relation Age of Onset    Hypertension Maternal Grandmother     Hyperlipidemia Maternal Grandmother     Breast cancer Maternal Grandmother         dx in age - unknown    Hypertension Mother     Asthma Sister     Asthma Son     Heart disease Maternal Aunt     Cancer Maternal Uncle     Colon cancer Maternal Uncle     Ovarian cancer Neg Hx      Social History   Substance Use Topics    Smoking status: Never Smoker    Smokeless tobacco: Never Used    Alcohol use 1.2 oz/week     2 Shots of liquor per week      Comment: occasional     Review of Systems   Constitutional: Positive for appetite change, chills and fatigue. Negative for fever.   HENT: Positive for rhinorrhea.    Eyes: Negative for visual disturbance.   Respiratory: Negative for cough and shortness of breath.    Cardiovascular: Negative for chest pain and palpitations.   Gastrointestinal: Positive for abdominal pain, diarrhea, nausea and vomiting. Negative for blood in stool and constipation.   Endocrine: Negative for polyuria.   Genitourinary: Negative for difficulty urinating, dysuria, flank pain, hematuria,  menstrual problem, pelvic pain and vaginal discharge.   Musculoskeletal: Negative for back pain and myalgias.   Skin: Negative for rash.   Allergic/Immunologic: Negative for immunocompromised state.   Neurological: Positive for headaches. Negative for light-headedness.       Physical Exam     Initial Vitals [05/11/18 1635]   BP Pulse Resp Temp SpO2   (!) 160/67 86 18 99 °F (37.2 °C) 96 %      MAP       98         Physical Exam    Nursing note and vitals reviewed.  Constitutional: She appears well-developed and well-nourished. She is not diaphoretic. She appears distressed.   Emesis bag in hand with clear yellowish fluid    HENT:   Head: Normocephalic and atraumatic.   Eyes: EOM are normal. Pupils are equal, round, and reactive to light. No scleral icterus.   Neck: Normal range of motion. Neck supple.   Cardiovascular: Normal rate, regular rhythm, normal heart sounds and intact distal pulses. Exam reveals no gallop and no friction rub.    No murmur heard.  Pulmonary/Chest: Breath sounds normal. No respiratory distress. She has no wheezes. She has no rhonchi. She has no rales. She exhibits no tenderness.   Abdominal: Soft. Bowel sounds are normal. She exhibits no distension and no mass. There is tenderness. There is no rebound and no guarding.   Mild diffuse tenderness to palpation   Musculoskeletal: Normal range of motion. She exhibits no edema or tenderness.   No CVA tenderness   Neurological: She is alert and oriented to person, place, and time.   Skin: Skin is warm and dry.         ED Course   Procedures  Labs Reviewed   CBC W/ AUTO DIFFERENTIAL   COMPREHENSIVE METABOLIC PANEL   LIPASE   URINALYSIS, REFLEX TO URINE CULTURE   POCT URINE PREGNANCY     Imaging Results          CT Abdomen Pelvis With Contrast (Final result)  Result time 05/11/18 20:54:10    Final result by Jameel Silva MD (05/11/18 20:54:10)                 Impression:      The appendix is not seen.  No pericecal phlegmon or abscess.  A few  small lymph nodes in the right lower quadrant mesentery.    Nonobstructing 3 mm calculus lower pole right kidney.    2.6 cm right adnexal cyst.    Mild hepatomegaly.      Electronically signed by: Jameel Silva MD  Date:    05/11/2018  Time:    20:54             Narrative:    EXAMINATION:  CT ABDOMEN PELVIS WITH CONTRAST    CLINICAL HISTORY:  epigastric pain + nausea/ vomiting;    TECHNIQUE:  Low dose axial images, sagittal and coronal reformations were obtained from the lung bases to the pubic symphysis following the IV administration of 100 mL of Omnipaque 350 .  Oral contrast was not administered.    COMPARISON:  None.    FINDINGS:  ABDOMEN:    - Lung bases: No infiltrates and no nodules.    - Liver: Mild hepatomegaly.  No focal mass.    - Gallbladder: No calcified gallstones.    - Bile Ducts: No evidence of intra or extra hepatic biliary ductal dilation.    - Spleen: Negative.    - Kidneys: No mass or hydronephrosis.  Nonobstructing 3 mm calculus lower pole right kidney.    - Adrenals: Unremarkable.    - Pancreas: No mass or peripancreatic fat stranding.    - Retroperitoneum:  No significant adenopathy.    - Vascular: No abdominal aortic aneurysm.    - Abdominal wall:  Small fat containing umbilical hernia.    PELVIS:    No pelvic mass, adenopathy, or free fluid.  2.6 cm right adnexal cyst.    BOWEL/MESENTERY:    No evidence of bowel obstruction or inflammation.  The appendix is not seen.  No pericecal phlegmon or abscess.  A few small lymph nodes in the right lower quadrant mesentery.    BONES:  No acute osseous abnormality and no suspicious lytic or blastic lesion.                                        Medical Decision Making:   History:   Old Medical Records: I decided to obtain old medical records.       APC / Resident Notes:   27-year-old female presents with nausea, vomiting, crampy abdominal pain and diarrhea.  Vitals stable on exam with mild, diffuse abdominal tenderness. Ddx includes gastroenteritis,  gastritis, food intolerance.  Will check labs, give Reglan for nausea fluids, Bentyl for pain and reassess.    Patient still vomiting actively despite Reglan.  Repeat abdominal exam with increased tenderness. Will give Phenergan and do abdominal CT.    Labs unremarkable, CT without acute process.  Patient feeling much better, no longer nauseous or vomiting. Able to tolerate p.o. Intake.  Given symptomatic improvement and negative workup, I do not believe she requires further ED treatment and is stable for discharge. Discussed the importance of follow-up as well as ED return precautions.  Patient voiced understanding and is comfortable discharge. Will discharge with Rx for Phenergan for nausea.  I discussed this patient my supervising physician who also examined the patient.    Philly Jernigan PA-C                   Clinical Impression:   The encounter diagnosis was Gastroenteritis.       I have reviewed and concur with the PA's history, physical, assessment, and plan.  I have personally interviewed and examined the patient at bedside.    Gloria Borja MD  05/12/18 0111

## 2018-05-12 NOTE — DISCHARGE INSTRUCTIONS
Try eating food that is easy to digest such as bread, bananas, apples, rice. Avoid greasy or spicy food and food with lots of dairy. Drink plenty of fluids. If you are unable to keep any food down, if you spike a high fever or have blood in your stool you should come back to the emergency department. Please schedule an appointment with your Primary Care Doctor for follow up.

## 2018-10-02 ENCOUNTER — HOSPITAL ENCOUNTER (EMERGENCY)
Facility: HOSPITAL | Age: 28
Discharge: HOME | End: 2018-10-03
Attending: EMERGENCY MEDICINE
Payer: MEDICAID

## 2018-10-02 DIAGNOSIS — J06.9 ACUTE URI: Primary | ICD-10-CM

## 2018-10-02 LAB
B-HCG UR QL: NEGATIVE
CTP QC/QA: YES
FLUAV AG NPH QL: NEGATIVE
FLUBV AG NPH QL: NEGATIVE
S PYO RRNA THROAT QL PROBE: NEGATIVE

## 2018-10-02 PROCEDURE — 81025 URINE PREGNANCY TEST: CPT | Performed by: EMERGENCY MEDICINE

## 2018-10-02 PROCEDURE — 87804 INFLUENZA ASSAY W/OPTIC: CPT

## 2018-10-02 PROCEDURE — 87070 CULTURE OTHR SPECIMN AEROBIC: CPT

## 2018-10-02 PROCEDURE — 87880 STREP A ASSAY W/OPTIC: CPT

## 2018-10-02 PROCEDURE — 99282 EMERGENCY DEPT VISIT SF MDM: CPT | Mod: 25

## 2018-10-03 VITALS
OXYGEN SATURATION: 99 % | HEIGHT: 65 IN | DIASTOLIC BLOOD PRESSURE: 74 MMHG | TEMPERATURE: 99 F | RESPIRATION RATE: 18 BRPM | WEIGHT: 260 LBS | HEART RATE: 88 BPM | BODY MASS INDEX: 43.32 KG/M2 | SYSTOLIC BLOOD PRESSURE: 126 MMHG

## 2018-10-03 RX ORDER — METHYLPREDNISOLONE 4 MG/1
TABLET ORAL
Qty: 1 PACKAGE | Refills: 0 | Status: SHIPPED | OUTPATIENT
Start: 2018-10-03 | End: 2021-01-19

## 2018-10-03 RX ORDER — MONTELUKAST SODIUM 10 MG/1
10 TABLET ORAL NIGHTLY
Qty: 30 TABLET | Refills: 0 | Status: SHIPPED | OUTPATIENT
Start: 2018-10-03 | End: 2018-11-02

## 2018-10-03 RX ORDER — ALBUTEROL SULFATE 90 UG/1
2 AEROSOL, METERED RESPIRATORY (INHALATION) EVERY 6 HOURS PRN
Qty: 1 INHALER | Refills: 0 | OUTPATIENT
Start: 2018-10-03 | End: 2022-12-27

## 2018-10-03 RX ORDER — LORATADINE 10 MG/1
10 TABLET ORAL DAILY
Qty: 30 TABLET | Refills: 0 | Status: SHIPPED | OUTPATIENT
Start: 2018-10-03 | End: 2021-01-19

## 2018-10-03 RX ORDER — HYDROCODONE POLISTIREX AND CHLORPHENIRAMINE POLISTIREX 10; 8 MG/5ML; MG/5ML
5 SUSPENSION, EXTENDED RELEASE ORAL EVERY 12 HOURS PRN
Qty: 60 ML | Refills: 0 | Status: SHIPPED | OUTPATIENT
Start: 2018-10-03 | End: 2021-01-19

## 2018-10-03 RX ORDER — FLUTICASONE PROPIONATE 50 MCG
2 SPRAY, SUSPENSION (ML) NASAL DAILY
Qty: 16 G | Refills: 0 | Status: SHIPPED | OUTPATIENT
Start: 2018-10-03 | End: 2021-01-19

## 2018-10-03 RX ORDER — BENZONATATE 100 MG/1
100 CAPSULE ORAL 3 TIMES DAILY PRN
Qty: 20 CAPSULE | Refills: 0 | Status: SHIPPED | OUTPATIENT
Start: 2018-10-03 | End: 2018-10-13

## 2018-10-03 NOTE — ED PROVIDER NOTES
"Encounter Date: 10/2/2018    SCRIBE #1 NOTE: I, Jean Marie Ramirez, am scribing for, and in the presence of,  Dr. South. I have scribed the following portions of the note - Other sections scribed: HPI, ROS, PE.       History     Chief Complaint   Patient presents with    cold symptoms and sore throat     pt c/o intermitt non productive cough with nasal congestion, body aches and sore throat starting today, deines fever , use of inhaler approx 1 hour pta, pt albe to talk in complete sentence, vs stable.     This is a 27 y.o. Female, with obesity and asthma,  who presents to the ED with a complaint of acute sore throat, chest pain with coughing, sore throat, cough (no sputum), nasal congestion, rhinorrhea that began yesterday. She has tried treating her symptoms with an inhaler, but notes minimal relief of her symptoms, but denies taking any sinus medication. She has had positive sick contact with her co-worker with similar symptoms. Patient denies any fever, nausea, vomiting, headaches, throat swelling, or denies SOB but describes feeling "suffocating" sensation that worsens when she lays down sometimes.  Patient denies history of tobacco use.      The history is provided by the patient.     Review of patient's allergies indicates:  No Known Allergies  Past Medical History:   Diagnosis Date    Anemia     Arthritis     Asthma     Complication of anesthesia     one sided epidural     Past Surgical History:   Procedure Laterality Date    LIGATION-TUBAL Bilateral 3/13/2013    [facility]    TUBAL LIGATION       Family History   Problem Relation Age of Onset    Hypertension Maternal Grandmother     Hyperlipidemia Maternal Grandmother     Breast cancer Maternal Grandmother         dx in age - unknown    Hypertension Mother     Asthma Sister     Asthma Son     Heart disease Maternal Aunt     Cancer Maternal Uncle     Colon cancer Maternal Uncle     Ovarian cancer Neg Hx  "     Social History     Tobacco Use    Smoking status: Never Smoker    Smokeless tobacco: Never Used   Substance Use Topics    Alcohol use: Yes     Alcohol/week: 1.2 oz     Types: 2 Shots of liquor per week     Comment: occasional    Drug use: No     Review of Systems   Constitutional: Negative for fever.   HENT: Positive for congestion, rhinorrhea and sore throat. Negative for ear pain, sinus pressure and voice change.    Respiratory: Positive for cough and shortness of breath.    Cardiovascular: Positive for chest pain (With coughing). Negative for leg swelling.   Gastrointestinal: Negative for nausea and vomiting.   Neurological: Negative for headaches.   All other systems reviewed and are negative.      Physical Exam     Initial Vitals [10/02/18 2244]   BP Pulse Resp Temp SpO2   116/73 92 18 98.8 °F (37.1 °C) 98 %      MAP       --         Physical Exam    Nursing note and vitals reviewed.  Constitutional: She appears well-developed and well-nourished.   HENT:   Head: Normocephalic and atraumatic.   Right Ear: Tympanic membrane and external ear normal.   Left Ear: Tympanic membrane and external ear normal.   Mouth/Throat: Uvula is midline, oropharynx is clear and moist and mucous membranes are normal. No oropharyngeal exudate, posterior oropharyngeal edema or posterior oropharyngeal erythema.   Phonation is normal, no stridor, no drooling.   Eyes: Conjunctivae are normal.   Neck: Normal range of motion. Neck supple.   Cardiovascular: Normal rate, regular rhythm, normal heart sounds and intact distal pulses. Exam reveals no gallop and no friction rub.    No murmur heard.  Pulses:       Dorsalis pedis pulses are 2+ on the right side, and 2+ on the left side.   Pulmonary/Chest: Breath sounds normal. No respiratory distress. She has no wheezes. She has no rhonchi. She has no rales.   Musculoskeletal: Normal range of motion.   Neurological: She is alert and oriented to person, place, and time.   Skin: Skin is warm  and dry. Capillary refill takes less than 2 seconds.   Psychiatric: She has a normal mood and affect. Her behavior is normal.         ED Course   Procedures  Labs Reviewed   CULTURE, RESPIRATORY  - THROAT   POCT URINE PREGNANCY   POCT INFLUENZA A/B   POCT RAPID STREP A          Imaging Results    None          Medical Decision Making:   Clinical Tests:   Lab Tests: Ordered            Scribe Attestation:   Scribe #1: I performed the above scribed service and the documentation accurately describes the services I performed. I attest to the accuracy of the note.              Labs Reviewed  Admission on 10/02/2018, Discharged on 10/03/2018   Component Date Value Ref Range Status    POC Preg Test, Ur 10/02/2018 Negative  Negative Final     Acceptable 10/02/2018 Yes   Final    Rapid Influenza A Ag 10/02/2018 Negative  Negative Final    Rapid Influenza B Ag 10/02/2018 Negative  Negative Final     Acceptable 10/02/2018 Yes   Final    Rapid Strep A Screen 10/02/2018 Negative  Negative Final     Acceptable 10/02/2018 Yes   Final        Imaging Reviewed    Imaging Results    None         Medications given in ED    Medications - No data to display    This document was produced by a scribe under my direction and in my presence. I agree with the content of the note and have made any necessary edits.     Bony South MD         Note was created using voice recognition software. Note may have occasional typographical errors that may not have been identified and edited despite good malena initial review prior to signing.  Discharge Medications        Medication List      START taking these medications    benzonatate 100 MG capsule  Commonly known as:  TESSALON  Take 1 capsule (100 mg total) by mouth 3 (three) times daily as needed.     fluticasone 50 mcg/actuation nasal spray  Commonly known as:  FLONASE  2 sprays (100 mcg total) by Each Nare route once daily.      hydrocodone-chlorpheniramine 10-8 mg/5 mL suspension  Commonly known as:  TUSSIONEX  Take 5 mLs by mouth every 12 (twelve) hours as needed for Cough or Congestion.     loratadine 10 mg tablet  Commonly known as:  CLARITIN  Take 1 tablet (10 mg total) by mouth once daily.     methylPREDNISolone 4 mg tablet  Commonly known as:  MEDROL DOSEPACK  Take as directed on package     montelukast 10 mg tablet  Commonly known as:  SINGULAIR  Take 1 tablet (10 mg total) by mouth every evening.        CHANGE how you take these medications    * albuterol 90 mcg/actuation inhaler  Commonly known as:  PROVENTIL/VENTOLIN HFA  Inhale 1-2 puffs into the lungs every 6 (six) hours as needed for Wheezing. Rescue  What changed:  Another medication with the same name was added. Make sure you understand how and when to take each.     * albuterol 90 mcg/actuation inhaler  Commonly known as:  PROVENTIL/VENTOLIN HFA  Inhale 2 puffs into the lungs every 6 (six) hours as needed for Wheezing or Shortness of Breath.  What changed:  You were already taking a medication with the same name, and this prescription was added. Make sure you understand how and when to take each.         * This list has 2 medication(s) that are the same as other medications prescribed for you. Read the directions carefully, and ask your doctor or other care provider to review them with you.            ASK your doctor about these medications    fluticasone-vilanterol 200-25 mcg/dose Dsdv diskus inhaler  Commonly known as:  BREO     ibuprofen 600 MG tablet  Commonly known as:  ADVIL,MOTRIN  Take 1 tablet (600 mg total) by mouth every 6 (six) hours as needed for Pain.     medroxyPROGESTERone 10 MG tablet  Commonly known as:  PROVERA  Take 1 tablet (10 mg total) by mouth once daily.     promethazine 25 MG tablet  Commonly known as:  PHENERGAN  Take 1 tablet (25 mg total) by mouth every 6 (six) hours as needed for Nausea.           Where to Get Your Medications      These  medications were sent to [id number] - [place] - [address] AT [facility] [address]  [address], [place] [address]    Hours:  24-hours Phone:  [phone]   · albuterol 90 mcg/actuation inhaler  · benzonatate 100 MG capsule  · fluticasone 50 mcg/actuation nasal spray  · loratadine 10 mg tablet  · methylPREDNISolone 4 mg tablet  · montelukast 10 mg tablet     You can get these medications from any pharmacy    Bring a paper prescription for each of these medications  · hydrocodone-chlorpheniramine 10-8 mg/5 mL suspension               Patient discharged to home in stable condition with instructions to:   1. Please take all meds as prescribed.  2. Follow-up with your primary care doctor   3. Return precautions discussed and patient and/or family/caretaker understands to return to the emergency room for any concerns including worsening of your current symptoms, fever, chills, night sweats, worsening pain, chest pain, shortness of breath, nausea, vomiting, diarrhea, bleeding, headache, difficulty talking, visual disturbances, weakness, numbness or any other acute concerns       Clinical Impression:     1. Acute URI                                   Bony South MD  10/03/18 [id number]

## 2018-10-05 LAB — BACTERIA THROAT CULT: NORMAL

## 2019-01-27 ENCOUNTER — HOSPITAL ENCOUNTER (EMERGENCY)
Facility: HOSPITAL | Age: 29
Discharge: HOME | End: 2019-01-27
Attending: EMERGENCY MEDICINE
Payer: MEDICAID

## 2019-01-27 VITALS
SYSTOLIC BLOOD PRESSURE: 119 MMHG | WEIGHT: 265 LBS | BODY MASS INDEX: 44.1 KG/M2 | TEMPERATURE: 99 F | DIASTOLIC BLOOD PRESSURE: 76 MMHG | OXYGEN SATURATION: 100 % | RESPIRATION RATE: 20 BRPM | HEART RATE: 76 BPM

## 2019-01-27 DIAGNOSIS — J06.9 ACUTE URI: Primary | ICD-10-CM

## 2019-01-27 PROCEDURE — 99284 EMERGENCY DEPT VISIT MOD MDM: CPT | Mod: ER

## 2019-01-27 RX ORDER — BENZONATATE 100 MG/1
100 CAPSULE ORAL 3 TIMES DAILY PRN
Qty: 20 CAPSULE | Refills: 0 | Status: SHIPPED | OUTPATIENT
Start: 2019-01-27 | End: 2019-02-06

## 2019-01-27 NOTE — ED PROVIDER NOTES
"Encounter Date: 1/27/2019       History     Chief Complaint   Patient presents with    URI     Pt states," Congestion, cough and body aches for two days."     28-year-old female with no past medical history presents to emergency department with 2 day history of cough associated with nasal congestion, frontal headache, and general myalgias.  Denies emesis, abdominal pain, and diarrhea.  Patient has taken TheraFlu with minimal relief of symptoms.  However, no medications were taken today.  Notes multiple sick contacts at home with similar symptoms. States it is similar to the her viral URI symptoms that she was diagnosed with in October last year.  Patient is tolerating p.o. without complication.  No recent use of antibiotics or hospitalizations.  No history of pneumonia.          Review of patient's allergies indicates:  No Known Allergies  Past Medical History:   Diagnosis Date    Anemia     Arthritis     Asthma     Complication of anesthesia     one sided epidural     Past Surgical History:   Procedure Laterality Date    LIGATION-TUBAL Bilateral 3/13/2013    Performed by Demarcus Moore MD at [facility] L&D    TUBAL LIGATION       Family History   Problem Relation Age of Onset    Hypertension Maternal Grandmother     Hyperlipidemia Maternal Grandmother     Breast cancer Maternal Grandmother         dx in age - unknown    Hypertension Mother     Asthma Sister     Asthma Son     Heart disease Maternal Aunt     Cancer Maternal Uncle     Colon cancer Maternal Uncle     Ovarian cancer Neg Hx      Social History     Tobacco Use    Smoking status: Never Smoker    Smokeless tobacco: Never Used   Substance Use Topics    Alcohol use: Yes     Alcohol/week: 1.2 oz     Types: 2 Shots of liquor per week     Comment: occasional    Drug use: No     Review of Systems   Constitutional: Negative for fever.   HENT: Positive for congestion and sore throat. Negative for trouble swallowing and voice change.  "   Respiratory: Positive for cough. Negative for shortness of breath.    Gastrointestinal: Negative for abdominal pain, diarrhea, nausea and vomiting.   Genitourinary: Negative for dysuria.   Musculoskeletal: Positive for myalgias.   Skin: Negative for rash.   Neurological: Positive for headaches.   All other systems reviewed and are negative.      Physical Exam     Initial Vitals [01/27/19 1030]   BP Pulse Resp Temp SpO2   131/71 93 16 98.5 °F (36.9 °C) 100 %      MAP       --         Physical Exam    Nursing note and vitals reviewed.  Constitutional: She appears well-developed and well-nourished. She is not diaphoretic. No distress.   HENT:   Head: Normocephalic and atraumatic.   Nasal congestion present without active rhinorrhea. No sinus TTP. TMs intact without erythema or swelling; able to discern bony landmarks. No mastoid tenderness or swelling behind the ears. No pain with manipulation of external ears. No oropharyngeal edema, swelling, erythema, tonsillar exudates, uvula deviation, changes in phonation, trismus, drooling, or cervical adenopathy. No meningeal signs.      Eyes: Conjunctivae and EOM are normal. Right eye exhibits no discharge. Left eye exhibits no discharge.   Neck: Normal range of motion. No tracheal deviation present. No JVD present.   Cardiovascular: Normal rate, regular rhythm and normal heart sounds. Exam reveals no friction rub.    No murmur heard.  Pulmonary/Chest: Breath sounds normal. No stridor. No respiratory distress. She has no wheezes. She has no rhonchi. She has no rales. She exhibits no tenderness.   Abdominal: Soft. She exhibits no distension. There is no tenderness. There is no rigidity, no rebound and no guarding.   Musculoskeletal: Normal range of motion.   Neurological: She is alert and oriented to person, place, and time.   Skin: Skin is warm and dry. No rash and no abscess noted. No erythema. No pallor.         ED Course   Procedures  Labs Reviewed   POCT URINE PREGNANCY           Imaging Results    None          Medical Decision Making:   History:   Old Medical Records: I decided to obtain old medical records.      This is an urgent evaluation of a 28 y.o. female presenting to the ED for cough. Denies abdominal pain and emesis. Afebrile. Patient is non-toxic appearing and in no acute distress. Spectrum of symptoms most consistent with viral URI in this patient with reported sick contacts. No focal lung findings, hypoxia, or prolonged period of symptoms to warrant CXR at this time as PNA is highly unlikely. No wheezing or respiratory distress to suggest acute asthma exacerbation. 0/4 Centor criteria in the presence of typical viral URI symptoms makes acute bacterial pharyngitis/tonsilitis unlikely. No sinus TTP or purulent rhinorrhea to suggest acute bacterial rhinosinusitis at this time. No evidence of AOM, mastoiditis, PTA, Florentin's, epiglottitis, and meningitis.       Discharged home with supportive care. I discussed the use of OTC medications for symptom control. I advised patient to maintain adequate hydration and advance diet as tolerated to maintain adequate nutrition.    I discussed with the patient the diagnosis, treatment plan, indications for return to the emergency department, and for expected follow-up. The patient verbalized an understanding. The patient is asked if there are any questions or concerns. We discuss the case, until all issues are addressed to the patients satisfaction. Patient understands and is agreeable to the plan.                    Clinical Impression:   The encounter diagnosis was Acute URI.                             Johan Yeh PA-C  01/27/19 1114

## 2019-04-13 ENCOUNTER — HOSPITAL ENCOUNTER (EMERGENCY)
Facility: HOSPITAL | Age: 29
Discharge: HOME | End: 2019-04-14
Attending: EMERGENCY MEDICINE
Payer: MEDICAID

## 2019-04-13 DIAGNOSIS — S51.019A ELBOW LACERATION: ICD-10-CM

## 2019-04-13 PROCEDURE — 99284 EMERGENCY DEPT VISIT MOD MDM: CPT | Mod: ER

## 2019-04-13 PROCEDURE — 12001 RPR S/N/AX/GEN/TRNK 2.5CM/<: CPT | Mod: ER

## 2019-04-13 PROCEDURE — 81025 URINE PREGNANCY TEST: CPT | Mod: ER | Performed by: EMERGENCY MEDICINE

## 2019-04-13 RX ORDER — LIDOCAINE HYDROCHLORIDE AND EPINEPHRINE 10; 10 MG/ML; UG/ML
1 INJECTION, SOLUTION INFILTRATION; PERINEURAL
Status: COMPLETED | OUTPATIENT
Start: 2019-04-14 | End: 2019-04-14

## 2019-04-14 VITALS
BODY MASS INDEX: 41.78 KG/M2 | HEART RATE: 95 BPM | SYSTOLIC BLOOD PRESSURE: 128 MMHG | HEIGHT: 66 IN | DIASTOLIC BLOOD PRESSURE: 65 MMHG | RESPIRATION RATE: 18 BRPM | OXYGEN SATURATION: 99 % | TEMPERATURE: 98 F | WEIGHT: 260 LBS

## 2019-04-14 LAB
B-HCG UR QL: NEGATIVE
CTP QC/QA: YES

## 2019-04-14 PROCEDURE — 25000003 PHARM REV CODE 250: Mod: ER | Performed by: EMERGENCY MEDICINE

## 2019-04-14 PROCEDURE — 12001 RPR S/N/AX/GEN/TRNK 2.5CM/<: CPT | Mod: ER

## 2019-04-14 RX ORDER — MUPIROCIN 20 MG/G
OINTMENT TOPICAL 2 TIMES DAILY
Qty: 22 G | Refills: 0 | Status: SHIPPED | OUTPATIENT
Start: 2019-04-14 | End: 2019-04-24

## 2019-04-14 RX ORDER — KETOROLAC TROMETHAMINE 10 MG/1
10 TABLET, FILM COATED ORAL
Status: COMPLETED | OUTPATIENT
Start: 2019-04-14 | End: 2019-04-14

## 2019-04-14 RX ORDER — IBUPROFEN 800 MG/1
800 TABLET ORAL EVERY 6 HOURS PRN
Qty: 20 TABLET | Refills: 0 | OUTPATIENT
Start: 2019-04-14 | End: 2019-11-14

## 2019-04-14 RX ORDER — SULFAMETHOXAZOLE AND TRIMETHOPRIM 800; 160 MG/1; MG/1
1 TABLET ORAL 2 TIMES DAILY
Qty: 14 TABLET | Refills: 0 | Status: SHIPPED | OUTPATIENT
Start: 2019-04-14 | End: 2019-04-21

## 2019-04-14 RX ADMIN — LIDOCAINE HYDROCHLORIDE AND EPINEPHRINE 1 ML: 10; 10 INJECTION, SOLUTION INFILTRATION; PERINEURAL at 12:04

## 2019-04-14 RX ADMIN — KETOROLAC TROMETHAMINE 10 MG: 10 TABLET, FILM COATED ORAL at 12:04

## 2019-04-14 RX ADMIN — BACITRACIN, NEOMYCIN, POLYMYXIN B 1 EACH: 400; 3.5; 5 OINTMENT TOPICAL at 12:04

## 2019-04-14 NOTE — ED NOTES
Pt c/o multiple lacerations to left arm after falling onto broken glass on kitchen floor.  Pt denies pain to any other location R/T fall.

## 2019-04-14 NOTE — ED PROVIDER NOTES
Encounter Date: 4/13/2019    SCRIBE #1 NOTE: I, Maritza Shaffer, am scribing for, and in the presence of,  Dr. South. I have scribed the following portions of the note - Other sections scribed: HPI, ROS, PE.       History     Chief Complaint   Patient presents with    Arm Pain     fall 10 minutes PTA on wet floor at home. Landed on broken glass, laceration noted to left elbow      28 y.o female presents with acute injury and pain to the left elbow sustained after she tripped and fell while holding a glass just prior to arrival.  She states the glass fell to the for broke her arm and she cut herself on the broken glass.  She denies head injury, loss of consciousness, headache or neck pain. Patient admits to EtOH intake tonight.     The history is provided by the patient. No  was used.     Review of patient's allergies indicates:  No Known Allergies  Past Medical History:   Diagnosis Date    Anemia     Arthritis     Asthma     Complication of anesthesia     one sided epidural     Past Surgical History:   Procedure Laterality Date    LIGATION-TUBAL Bilateral 3/13/2013    Performed by Demarcus Moore MD at [facility]    TUBAL LIGATION       Family History   Problem Relation Age of Onset    Hypertension Maternal Grandmother     Hyperlipidemia Maternal Grandmother     Breast cancer Maternal Grandmother         dx in age - unknown    Hypertension Mother     Asthma Sister     Asthma Son     Heart disease Maternal Aunt     Cancer Maternal Uncle     Colon cancer Maternal Uncle     Ovarian cancer Neg Hx      Social History     Tobacco Use    Smoking status: Never Smoker    Smokeless tobacco: Never Used   Substance Use Topics    Alcohol use: Yes     Alcohol/week: 1.2 oz     Types: 2 Shots of liquor per week     Comment: occasional    Drug use: No     Review of Systems   Musculoskeletal: Positive for arthralgias (left elbow).   Skin: Positive for wound (left elbow).   Neurological:  Negative for syncope and headaches.   All other systems reviewed and are negative.      Physical Exam     Initial Vitals [19 2329]   BP Pulse Resp Temp SpO2   122/70 108 20 98 °F (36.7 °C) 99 %      MAP       --         Physical Exam    Nursing note and vitals reviewed.  Constitutional: She appears well-developed and well-nourished. She is not diaphoretic. No distress.   HENT:   Head: Normocephalic and atraumatic.   Eyes: EOM are normal.   Neck: Normal range of motion.   Cardiovascular:   Pulses:       Radial pulses are 2+ on the left side.   Pulmonary/Chest: Effort normal. No respiratory distress.   Abdominal: Soft.   Musculoskeletal: Normal range of motion.        Left elbow: She exhibits laceration. She exhibits normal range of motion, no swelling, no effusion and no deformity.   Neurological: She is alert and oriented to person, place, and time. No cranial nerve deficit or sensory deficit.   Skin: Skin is warm and dry. Capillary refill takes less than 2 seconds. Abrasion and laceration noted.        Psychiatric: She has a normal mood and affect. Her behavior is normal.         ED Course   Lac Repair  Date/Time: 2019 12:08 AM  Performed by: Bony South MD  Authorized by: Bony South MD   Consent Done: Yes  Consent: Verbal consent obtained.  Risks and benefits: risks, benefits and alternatives were discussed  Consent given by: patient  Patient understanding: patient states understanding of the procedure being performed  Patient identity confirmed:  and name  Body area: upper extremity  Location details: left elbow  Laceration length: 1 cm  Foreign bodies: glass (1 fragment of glass removed)  Tendon involvement: none  Nerve involvement: none  Vascular damage: no  Anesthesia: local infiltration    Anesthesia:  Local Anesthetic: lidocaine 1% with epinephrine  Anesthetic total: 3 mL  Patient sedated: no  Preparation: Patient was prepped and draped in the usual sterile fashion.  Irrigation solution:  saline  Irrigation method: syringe  Amount of cleaning: standard  Debridement: none  Degree of undermining: none  Skin closure: 4-0 Prolene  Number of sutures: 2  Technique: complex and horizontal mattress (1 corner stitch, 1 horizontal mattress)  Approximation: close  Approximation difficulty: simple  Dressing: antibiotic ointment and dressing applied  Patient tolerance: Patient tolerated the procedure well with no immediate complications  Comments: Neurovascular status intact before and after procedure done.    Lac Repair  Date/Time: 2019 12:09 AM  Performed by: Maritza Shaffer  Authorized by: Bony South MD   Consent Done: Yes  Consent: Verbal consent obtained.  Risks and benefits: risks, benefits and alternatives were discussed  Consent given by: patient  Patient understanding: patient states understanding of the procedure being performed  Patient consent: the patient's understanding of the procedure matches consent given  Procedure consent: procedure consent matches procedure scheduled  Relevant documents: relevant documents present and verified  Imaging studies: imaging studies available  Patient identity confirmed:  and name  Body area: upper extremity  Location details: left elbow  Laceration length: 1 cm  Foreign bodies: no foreign bodies  Tendon involvement: none  Vascular damage: no  Anesthesia: local infiltration    Anesthesia:  Local Anesthetic: lidocaine 1% with epinephrine  Anesthetic total: 2 mL  Patient sedated: no  Irrigation solution: saline  Irrigation method: syringe  Amount of cleaning: standard  Debridement: none  Degree of undermining: none  Skin closure: 4-0 Prolene  Number of sutures: 4  Technique: simple and horizontal mattress (3 simple 1 horizontal mattress)  Approximation: close  Approximation difficulty: simple  Dressing: antibiotic ointment and dressing applied  Patient tolerance: Patient tolerated the procedure well with no immediate complications  Comments: Neurovascular  status intact before and after procedure done.        Labs Reviewed   POCT URINE PREGNANCY          Imaging Results          X-Ray Elbow 2 Views Left (Final result)  Result time 04/14/19 00:16:14   Procedure changed from X-Ray Elbow Complete Left     Final result by Kaz Garvey MD (04/14/19 00:16:14)                 Impression:      See above.      Electronically signed by: Kaz Garvey MD  Date:    04/14/2019  Time:    00:16             Narrative:    EXAMINATION:  XR ELBOW 2 VIEWS LEFT    CLINICAL HISTORY:  Laceration;  Laceration without foreign body of unspecified elbow, initial encounter    COMPARISON:  None    FINDINGS:  No evidence of acute displaced fracture, dislocation, or osseous destructive process.  No significant elbow joint effusion.    Laceration is seen at the posterior aspect of the elbow.  There is radiopaque density seen in this region which could represent retained foreign body.                                 Medical Decision Making:   Clinical Tests:   Lab Tests: Ordered and Reviewed  Radiological Study: Ordered and Reviewed            Scribe Attestation:   Scribe #1: I performed the above scribed service and the documentation accurately describes the services I performed. I attest to the accuracy of the note.      Labs Reviewed  Admission on 04/13/2019, Discharged on 04/14/2019   Component Date Value Ref Range Status    POC Preg Test, Ur 04/13/2019 Negative  Negative Final     Acceptable 04/13/2019 Yes   Final        Imaging Reviewed    Imaging Results          X-Ray Elbow 2 Views Left (Final result)  Result time 04/14/19 00:16:14   Procedure changed from X-Ray Elbow Complete Left     Final result by Kaz Garvey MD (04/14/19 00:16:14)                 Impression:      See above.      Electronically signed by: Kaz Garvey MD  Date:    04/14/2019  Time:    00:16             Narrative:    EXAMINATION:  XR ELBOW 2 VIEWS LEFT    CLINICAL HISTORY:  Laceration;   Laceration without foreign body of unspecified elbow, initial encounter    COMPARISON:  None    FINDINGS:  No evidence of acute displaced fracture, dislocation, or osseous destructive process.  No significant elbow joint effusion.    Laceration is seen at the posterior aspect of the elbow.  There is radiopaque density seen in this region which could represent retained foreign body.                                Medications given in ED    Medications   lidocaine-EPINEPHrine 1%-1:100,000 injection 1 mL (1 mL Intradermal Given 4/14/19 0030)   neomycin-bacitracnZn-polymyxnB packet 1 each (1 each Topical (Top) Given 4/14/19 0029)   ketorolac tablet 10 mg (10 mg Oral Given 4/14/19 0029)       This document was produced by a scribe under my direction and in my presence. I agree with the content of the note and have made any necessary edits.     Bony South MD         Note was created using voice recognition software. Note may have occasional typographical errors that may not have been identified and edited despite good malena initial review prior to signing.        Discharge Medications     Discharge Medication List as of 4/14/2019  1:32 AM      START taking these medications    Details   ibuprofen (ADVIL,MOTRIN) 800 MG tablet Take 1 tablet (800 mg total) by mouth every 6 (six) hours as needed for Pain., Starting Sun 4/14/2019, Print      mupirocin (BACTROBAN) 2 % ointment Apply topically 2 (two) times daily. for 10 days, Starting Sun 4/14/2019, Until Wed 4/24/2019, Print      sulfamethoxazole-trimethoprim 800-160mg (BACTRIM DS) 800-160 mg Tab Take 1 tablet by mouth 2 (two) times daily. for 7 days, Starting Sun 4/14/2019, Until Sun 4/21/2019, Print         CONTINUE these medications which have NOT CHANGED    Details   albuterol (PROVENTIL/VENTOLIN HFA) 90 mcg/actuation inhaler Inhale 2 puffs into the lungs every 6 (six) hours as needed for Wheezing or Shortness of Breath., Starting Wed 10/3/2018, Normal      fluticasone  (FLONASE) 50 mcg/actuation nasal spray 2 sprays (100 mcg total) by Each Nare route once daily., Starting Wed 10/3/2018, Normal      fluticasone-vilanterol (BREO) 200-25 mcg/dose DsDv diskus inhaler Inhale 1 puff into the lungs once daily. Controller, Historical Med      hydrocodone-chlorpheniramine (TUSSIONEX) 10-8 mg/5 mL suspension Take 5 mLs by mouth every 12 (twelve) hours as needed for Cough or Congestion., Starting Wed 10/3/2018, Print      loratadine (CLARITIN) 10 mg tablet Take 1 tablet (10 mg total) by mouth once daily., Starting Wed 10/3/2018, Until Thu 10/3/2019, Normal      medroxyPROGESTERone (PROVERA) 10 MG tablet Take 1 tablet (10 mg total) by mouth once daily., Starting 1/14/2015, Until Thu 6/9/16, Normal      methylPREDNISolone (MEDROL DOSEPACK) 4 mg tablet Take as directed on package, Normal      promethazine (PHENERGAN) 25 MG tablet Take 1 tablet (25 mg total) by mouth every 6 (six) hours as needed for Nausea., Starting Fri 5/11/2018, Print                   Patient discharged to home in stable condition with instructions to:   1. Please take all meds as prescribed.  2. Follow-up with your primary care doctor   3. Return precautions discussed and patient and/or family/caretaker understands to return to the emergency room for any concerns including worsening of your current symptoms, fever, chills, night sweats, worsening pain, chest pain, shortness of breath, nausea, vomiting, diarrhea, bleeding, headache, difficulty talking, visual disturbances, weakness, numbness or any other acute concerns          Clinical Impression:     1. Elbow laceration                                 Bony South MD  04/15/19 1586

## 2019-04-15 ENCOUNTER — PES CALL (OUTPATIENT)
Dept: ADMINISTRATIVE | Facility: CLINIC | Age: 29
End: 2019-04-15

## 2019-11-14 ENCOUNTER — HOSPITAL ENCOUNTER (EMERGENCY)
Facility: HOSPITAL | Age: 29
Discharge: HOME | End: 2019-11-14
Attending: EMERGENCY MEDICINE
Payer: MEDICAID

## 2019-11-14 VITALS
RESPIRATION RATE: 19 BRPM | WEIGHT: 268 LBS | TEMPERATURE: 98 F | SYSTOLIC BLOOD PRESSURE: 141 MMHG | DIASTOLIC BLOOD PRESSURE: 90 MMHG | HEIGHT: 66 IN | OXYGEN SATURATION: 100 % | BODY MASS INDEX: 43.07 KG/M2 | HEART RATE: 77 BPM

## 2019-11-14 DIAGNOSIS — J45.909 ASTHMA, UNSPECIFIED ASTHMA SEVERITY, UNSPECIFIED WHETHER COMPLICATED, UNSPECIFIED WHETHER PERSISTENT: ICD-10-CM

## 2019-11-14 DIAGNOSIS — J11.1 INFLUENZA-LIKE ILLNESS: Primary | ICD-10-CM

## 2019-11-14 LAB
B-HCG UR QL: NEGATIVE
CTP QC/QA: YES
CTP QC/QA: YES
POC MOLECULAR INFLUENZA A AGN: NEGATIVE
POC MOLECULAR INFLUENZA B AGN: NEGATIVE

## 2019-11-14 PROCEDURE — 87804 INFLUENZA ASSAY W/OPTIC: CPT | Mod: ER

## 2019-11-14 PROCEDURE — 99284 EMERGENCY DEPT VISIT MOD MDM: CPT | Mod: 25,ER

## 2019-11-14 PROCEDURE — 87502 INFLUENZA DNA AMP PROBE: CPT | Mod: ER

## 2019-11-14 PROCEDURE — 81025 URINE PREGNANCY TEST: CPT | Mod: ER | Performed by: PHYSICIAN ASSISTANT

## 2019-11-14 RX ORDER — PROMETHAZINE HYDROCHLORIDE AND DEXTROMETHORPHAN HYDROBROMIDE 6.25; 15 MG/5ML; MG/5ML
5 SYRUP ORAL 3 TIMES DAILY PRN
Qty: 200 ML | Refills: 0 | Status: SHIPPED | OUTPATIENT
Start: 2019-11-14 | End: 2019-11-24

## 2019-11-14 RX ORDER — ACETAMINOPHEN 500 MG
1000 TABLET ORAL EVERY 6 HOURS PRN
Qty: 30 TABLET | Refills: 0 | OUTPATIENT
Start: 2019-11-14 | End: 2021-08-10

## 2019-11-14 RX ORDER — OSELTAMIVIR PHOSPHATE 75 MG/1
75 CAPSULE ORAL 2 TIMES DAILY
Qty: 10 CAPSULE | Refills: 0 | Status: SHIPPED | OUTPATIENT
Start: 2019-11-14 | End: 2019-11-19

## 2019-11-14 RX ORDER — IBUPROFEN 600 MG/1
600 TABLET ORAL EVERY 6 HOURS PRN
Qty: 20 TABLET | Refills: 0 | Status: SHIPPED | OUTPATIENT
Start: 2019-11-14 | End: 2019-12-29 | Stop reason: ALTCHOICE

## 2019-11-14 NOTE — ED PROVIDER NOTES
Encounter Date: 11/14/2019       History     Chief Complaint   Patient presents with    Influenza     onset yesterday    Fever    Headache    Chills    Generalized Body Aches    Cough     29-year-old female chief complaint I have the flu.  Patient states she has been having runny nose congestion and cough for the last 3 days.  Patient states she is coughing up yellow and green stuff.  Patient states she has been taking NyQuil with no improvement.  Patient does report a history of asthma.      The history is provided by the patient.   URI   The primary symptoms include fever, cough and myalgias. Primary symptoms do not include sore throat, nausea, vomiting or rash. The current episode started several days ago. This is a new problem. The problem has been gradually worsening. The fever began yesterday. The fever has been gradually worsening since its onset. The fever has been present for less than 1 day. The temperature was taken by no thermometer. The maximum temperature recorded prior to her arrival was unknown.   The cough began 2 days ago. The cough is productive. The sputum is green.   Myalgias began yesterday. The myalgias have been gradually worsening since their onset. The myalgias are generalized. The myalgias are not associated with weakness, tenderness or swelling. The myalgia pain is at a severity of 4/10.   The onset of the illness is associated with exposure to sick contacts. Symptoms associated with the illness include chills and congestion. The following treatments were addressed: Acetaminophen was not tried. Aspirin was not tried. NSAIDs were not tried.     Review of patient's allergies indicates:  No Known Allergies  Past Medical History:   Diagnosis Date    Anemia     Arthritis     Asthma     Complication of anesthesia     one sided epidural     Past Surgical History:   Procedure Laterality Date    TUBAL LIGATION       Family History   Problem Relation Age of Onset    Hypertension  Maternal Grandmother     Hyperlipidemia Maternal Grandmother     Breast cancer Maternal Grandmother         dx in age - unknown    Hypertension Mother     Asthma Sister     Asthma Son     Heart disease Maternal Aunt     Cancer Maternal Uncle     Colon cancer Maternal Uncle     Ovarian cancer Neg Hx      Social History     Tobacco Use    Smoking status: Never Smoker    Smokeless tobacco: Never Used   Substance Use Topics    Alcohol use: Yes     Alcohol/week: 2.0 standard drinks     Types: 2 Shots of liquor per week     Comment: occasional    Drug use: No     Review of Systems   Constitutional: Positive for chills and fever.   HENT: Positive for congestion. Negative for sore throat.    Respiratory: Positive for cough. Negative for shortness of breath.    Cardiovascular: Negative for chest pain.   Gastrointestinal: Negative for nausea and vomiting.   Genitourinary: Negative for dysuria.   Musculoskeletal: Positive for myalgias. Negative for back pain.   Skin: Negative for rash.   Neurological: Negative for weakness.   Hematological: Does not bruise/bleed easily.   All other systems reviewed and are negative.      Physical Exam     Initial Vitals [11/14/19 0926]   BP Pulse Resp Temp SpO2   (!) 142/93 75 19 97.8 °F (36.6 °C) 100 %      MAP       --         Physical Exam    Nursing note and vitals reviewed.  Constitutional: She appears well-developed and well-nourished.   HENT:   Head: Normocephalic and atraumatic.   Right Ear: Tympanic membrane and external ear normal.   Left Ear: Tympanic membrane and external ear normal.   Nose: Mucosal edema and rhinorrhea present. Right sinus exhibits no maxillary sinus tenderness and no frontal sinus tenderness. Left sinus exhibits no maxillary sinus tenderness and no frontal sinus tenderness.   Eyes: Conjunctivae and EOM are normal. Pupils are equal, round, and reactive to light.   Neck: Normal range of motion. Neck supple.   Cardiovascular: Normal rate, regular  rhythm, normal heart sounds and intact distal pulses. Exam reveals no gallop and no friction rub.    No murmur heard.  Pulmonary/Chest: Breath sounds normal. No respiratory distress. She has no wheezes. She has no rhonchi. She has no rales. She exhibits no tenderness.   Abdominal: Soft. Bowel sounds are normal. She exhibits no distension. There is no tenderness. There is no rebound and no guarding.   Musculoskeletal: Normal range of motion. She exhibits no edema or tenderness.   Lymphadenopathy:     She has no cervical adenopathy.   Neurological: She is alert and oriented to person, place, and time. She has normal strength.   Skin: Skin is warm and dry. Capillary refill takes less than 2 seconds. No rash noted.   Psychiatric: She has a normal mood and affect.         ED Course   Procedures  Labs Reviewed   POCT INFLUENZA A/B MOLECULAR   POCT URINE PREGNANCY          Imaging Results          X-Ray Chest PA And Lateral (Final result)  Result time 11/14/19 10:38:55    Final result by Shashank Patterson MD (11/14/19 10:38:55)                 Impression:      No acute findings      Electronically signed by: Shashank Patterson MD  Date:    11/14/2019  Time:    10:38             Narrative:    EXAMINATION:  XR CHEST PA AND LATERAL    CLINICAL HISTORY:  cough;    TECHNIQUE:  PA and lateral views of the chest were performed.    COMPARISON:  7/17/7    FINDINGS:  Cardiac size is normal.  Lungs are clear no infiltrate is seen.                                                             Medical decision making   Chief complaint:  Abdomen fluid  Differential diagnosis:  Influenza A influenza B, asthma exacerbation, otitis media, bronchitis and pneumonia  Treatment in the ED Physical Exam.  Patient wake alert oriented x4 speaking clearly in full sentences in no acute distress.  Discussed labs, and imaging results.    Fill and take prescriptions as directed.  Return to the ED if symptoms worsen or do not resolve.   Answered  questions and discussed discharge plan.    Patient feels better and is ready for discharge.  Follow up with PCP/specialist in 1 day.      Clinical Impression:       ICD-10-CM ICD-9-CM   1. Influenza-like illness R69 799.89   2. Asthma, unspecified asthma severity, unspecified whether complicated, unspecified whether persistent J45.909 493.90                             Lola Ortega DO  11/14/19 1054

## 2019-12-29 ENCOUNTER — HOSPITAL ENCOUNTER (EMERGENCY)
Facility: HOSPITAL | Age: 29
Discharge: HOME | End: 2019-12-29
Attending: EMERGENCY MEDICINE
Payer: MEDICAID

## 2019-12-29 VITALS
HEIGHT: 66 IN | HEART RATE: 85 BPM | OXYGEN SATURATION: 97 % | RESPIRATION RATE: 16 BRPM | BODY MASS INDEX: 43.07 KG/M2 | TEMPERATURE: 99 F | DIASTOLIC BLOOD PRESSURE: 90 MMHG | WEIGHT: 268 LBS | SYSTOLIC BLOOD PRESSURE: 138 MMHG

## 2019-12-29 DIAGNOSIS — N39.0 URINARY TRACT INFECTION WITH HEMATURIA, SITE UNSPECIFIED: Primary | ICD-10-CM

## 2019-12-29 DIAGNOSIS — R31.9 URINARY TRACT INFECTION WITH HEMATURIA, SITE UNSPECIFIED: Primary | ICD-10-CM

## 2019-12-29 LAB
B-HCG UR QL: NEGATIVE
BILIRUBIN, POC UA: NEGATIVE
BLOOD, POC UA: ABNORMAL
CLARITY, POC UA: CLEAR
COLOR, POC UA: YELLOW
CTP QC/QA: YES
GLUCOSE, POC UA: NEGATIVE
KETONES, POC UA: NEGATIVE
LEUKOCYTE EST, POC UA: ABNORMAL
NITRITE, POC UA: NEGATIVE
PH UR STRIP: 6.5 [PH]
PROTEIN, POC UA: ABNORMAL
SPECIFIC GRAVITY, POC UA: >=1.03
UROBILINOGEN, POC UA: 0.2 E.U./DL

## 2019-12-29 PROCEDURE — 99284 EMERGENCY DEPT VISIT MOD MDM: CPT | Mod: 25,ER

## 2019-12-29 PROCEDURE — 81003 URINALYSIS AUTO W/O SCOPE: CPT | Mod: ER

## 2019-12-29 PROCEDURE — 87086 URINE CULTURE/COLONY COUNT: CPT

## 2019-12-29 PROCEDURE — 81025 URINE PREGNANCY TEST: CPT | Mod: ER | Performed by: EMERGENCY MEDICINE

## 2019-12-29 RX ORDER — IBUPROFEN 600 MG/1
600 TABLET ORAL EVERY 6 HOURS PRN
Qty: 24 TABLET | Refills: 0 | OUTPATIENT
Start: 2019-12-29 | End: 2020-05-28

## 2019-12-29 RX ORDER — NITROFURANTOIN 25; 75 MG/1; MG/1
100 CAPSULE ORAL 2 TIMES DAILY
Qty: 14 CAPSULE | Refills: 0 | Status: SHIPPED | OUTPATIENT
Start: 2019-12-29 | End: 2020-01-05

## 2019-12-29 NOTE — ED PROVIDER NOTES
Encounter Date: 12/29/2019    SCRIBE #1 NOTE: I, Marizta Shaffer, am scribing for, and in the presence of,  Cristina Davey NP. I have scribed the following portions of the note - Other sections scribed: HPI, ROS, PE .       History     Chief Complaint   Patient presents with    Abdominal Pain     Lower Left side abd pain radiating to middle back x 2 days. Denies N/V/D.     This is a 29 non toxic appearing female with anemia, arthritis, and asthma presents to the ED complaining of left flank pain  radiating to her lower back since last night. She denies N/V/D, hematuria, fever, or dysuria. She notes increased urinary frequency. Hx of UTI.     The history is provided by the patient. No  was used.   Abdominal Pain   The current episode started yesterday. The problem has not changed since onset.The abdominal pain is located in the left flank. The abdominal pain radiates to the back. The severity of the abdominal pain is 5/10. The abdominal pain is relieved by nothing. The abdominal pain is exacerbated by movement. The other symptoms of the illness do not include fever, shortness of breath, nausea, vomiting, diarrhea or dysuria.   The patient states that she believes she is currently not pregnant. The patient has not had a change in bowel habit. Additional symptoms associated with the illness include frequency and back pain. Symptoms associated with the illness do not include chills, urgency or hematuria. Significant associated medical issues do not include diabetes.     Review of patient's allergies indicates:  No Known Allergies  Past Medical History:   Diagnosis Date    Anemia     Arthritis     Asthma     Carpal tunnel syndrome     Complication of anesthesia     one sided epidural     Past Surgical History:   Procedure Laterality Date    TUBAL LIGATION       Family History   Problem Relation Age of Onset    Hypertension Maternal Grandmother     Hyperlipidemia Maternal Grandmother     Breast  cancer Maternal Grandmother         dx in age - unknown    Hypertension Mother     Asthma Sister     Asthma Son     Heart disease Maternal Aunt     Cancer Maternal Uncle     Colon cancer Maternal Uncle     Ovarian cancer Neg Hx      Social History     Tobacco Use    Smoking status: Never Smoker    Smokeless tobacco: Never Used   Substance Use Topics    Alcohol use: Yes     Alcohol/week: 2.0 standard drinks     Types: 2 Shots of liquor per week     Comment: occasional    Drug use: No     Review of Systems   Constitutional: Negative.  Negative for chills and fever.   HENT: Negative.    Eyes: Negative.    Respiratory: Negative.  Negative for shortness of breath.    Cardiovascular: Negative.  Negative for chest pain.   Gastrointestinal: Positive for abdominal pain. Negative for diarrhea, nausea and vomiting.   Endocrine: Negative.    Genitourinary: Positive for frequency. Negative for dysuria, hematuria and urgency.   Musculoskeletal: Positive for back pain.   Skin: Negative.    Allergic/Immunologic: Negative.    Neurological: Negative.    Hematological: Negative.    Psychiatric/Behavioral: Negative.    All other systems reviewed and are negative.      Physical Exam     Initial Vitals [12/29/19 0928]   BP Pulse Resp Temp SpO2   (!) 138/90 85 16 98.9 °F (37.2 °C) 97 %      MAP       --         Physical Exam    Nursing note and vitals reviewed.  Constitutional: She appears well-developed. She is not diaphoretic. No distress.   HENT:   Head: Normocephalic.   Eyes: Conjunctivae are normal.   Neck: Normal range of motion.   Cardiovascular: Normal rate, regular rhythm and normal heart sounds.   Pulmonary/Chest: Breath sounds normal.   Abdominal: Soft. Bowel sounds are normal. There is no tenderness. There is no CVA tenderness.   Musculoskeletal: Normal range of motion.   Neurological: She is alert and oriented to person, place, and time. No cranial nerve deficit or sensory deficit.   Skin: Skin is warm and dry.    Psychiatric: She has a normal mood and affect.         ED Course   Procedures  Labs Reviewed   POCT URINALYSIS W/O SCOPE - Abnormal; Notable for the following components:       Result Value    Spec Grav UA >=1.030 (*)     Blood, UA 2+ (*)     Protein, UA 1+ (*)     Leukocytes, UA Trace (*)     All other components within normal limits   CULTURE, URINE   POCT URINE PREGNANCY   POCT URINALYSIS W/O SCOPE          Imaging Results    None          Medical Decision Making:   Initial Assessment:   This is a 29 non toxic appearing female with anemia, arthritis, and asthma presents to the ED complaining of left flank pain radiating to her lower back since last night. She denies N/V/D, hematuria, fever, or dysuria. She notes increased urinary frequency.     Differential Diagnosis:   Urinary tract infection, pyelonephritis, kidney stone  Clinical Tests:   Lab Tests: Ordered and Reviewed  ED Management:  Patient discharged home with Macrobid and Motrin.  Follow-up with PCP in 2 days.  Return ED for worsening of symptoms.            Scribe Attestation:   Scribe #1: I performed the above scribed service and the documentation accurately describes the services I performed. I attest to the accuracy of the note.              This document was produced by a scribe under my direction and in my presence. I agree with the content of the note and have made any necessary edits.     PAULY Phillips    12/29/2019 10:19 AM         Clinical Impression:     1. Urinary tract infection without hematuria, site unspecified                                PAULY Junior  12/29/19 1020

## 2019-12-31 LAB — BACTERIA UR CULT: NORMAL

## 2020-02-10 ENCOUNTER — HOSPITAL ENCOUNTER (EMERGENCY)
Facility: HOSPITAL | Age: 30
Discharge: HOME | End: 2020-02-10
Attending: EMERGENCY MEDICINE
Payer: MEDICAID

## 2020-02-10 VITALS
HEIGHT: 65 IN | BODY MASS INDEX: 46.32 KG/M2 | OXYGEN SATURATION: 99 % | SYSTOLIC BLOOD PRESSURE: 141 MMHG | DIASTOLIC BLOOD PRESSURE: 74 MMHG | HEART RATE: 69 BPM | TEMPERATURE: 98 F | WEIGHT: 278 LBS | RESPIRATION RATE: 18 BRPM

## 2020-02-10 DIAGNOSIS — R07.9 CHEST PAIN: ICD-10-CM

## 2020-02-10 DIAGNOSIS — M94.0 COSTOCHONDRITIS: Primary | ICD-10-CM

## 2020-02-10 PROCEDURE — 93010 EKG 12-LEAD: ICD-10-PCS | Mod: ,,, | Performed by: INTERNAL MEDICINE

## 2020-02-10 PROCEDURE — 93005 ELECTROCARDIOGRAM TRACING: CPT | Mod: ER

## 2020-02-10 PROCEDURE — 93010 ELECTROCARDIOGRAM REPORT: CPT | Mod: ,,, | Performed by: INTERNAL MEDICINE

## 2020-02-10 PROCEDURE — 99283 EMERGENCY DEPT VISIT LOW MDM: CPT | Mod: 25,ER

## 2020-02-10 RX ORDER — PREDNISONE 10 MG/1
10 TABLET ORAL DAILY
Qty: 5 TABLET | Refills: 0 | Status: SHIPPED | OUTPATIENT
Start: 2020-02-10 | End: 2020-02-15

## 2020-02-10 NOTE — ED PROVIDER NOTES
Encounter Date: 2/10/2020    SCRIBE #1 NOTE: I, Musa Mcduffie, am scribing for, and in the presence of,  Dr. Nguyen. I have scribed the following portions of the note - Other sections scribed: HPI, ROS, PE.       History     Chief Complaint   Patient presents with    chest discomfort     pt states that since yesterday her chest started with and achy pain to the left side that does not radiate. pt states that the discomfort is not resolving so she wants it checked out.      29 y.o. female with non radiating left sided chest pain beginning yesterday. Pain is described and achy and constant. Pain has not resolved and she has not experienced this before so she wanted to get it checked. No significant cardiac medical history.      The history is provided by the patient. No  was used.     Review of patient's allergies indicates:  No Known Allergies  Past Medical History:   Diagnosis Date    Anemia     Arthritis     Asthma     Carpal tunnel syndrome     Complication of anesthesia     one sided epidural     Past Surgical History:   Procedure Laterality Date    TUBAL LIGATION       Family History   Problem Relation Age of Onset    Hypertension Maternal Grandmother     Hyperlipidemia Maternal Grandmother     Breast cancer Maternal Grandmother         dx in age - unknown    Hypertension Mother     Asthma Sister     Asthma Son     Heart disease Maternal Aunt     Cancer Maternal Uncle     Colon cancer Maternal Uncle     Ovarian cancer Neg Hx      Social History     Tobacco Use    Smoking status: Never Smoker    Smokeless tobacco: Never Used   Substance Use Topics    Alcohol use: Yes     Alcohol/week: 2.0 standard drinks     Types: 2 Shots of liquor per week     Comment: occasional    Drug use: No     Review of Systems   Constitutional: Negative.  Negative for fever.   HENT: Negative.  Negative for sore throat.    Eyes: Negative.    Respiratory: Positive for shortness of breath.     Cardiovascular: Positive for chest pain.   Gastrointestinal: Negative.  Negative for nausea and vomiting.   Endocrine: Negative.    Genitourinary: Negative.  Negative for dysuria.   Musculoskeletal: Negative.  Negative for myalgias.   Skin: Negative.  Negative for rash.   Allergic/Immunologic: Negative.    Neurological: Negative.  Negative for headaches.   Hematological: Negative.  Negative for adenopathy.   Psychiatric/Behavioral: Negative.  Negative for behavioral problems.   All other systems reviewed and are negative.      Physical Exam     Initial Vitals [02/10/20 0906]   BP Pulse Resp Temp SpO2   (!) 141/74 69 18 97.9 °F (36.6 °C) 99 %      MAP       --         Physical Exam    Nursing note and vitals reviewed.  Constitutional: She appears well-developed and well-nourished.   HENT:   Head: Normocephalic and atraumatic.   Right Ear: External ear normal.   Left Ear: External ear normal.   Nose: Nose normal.   Eyes: Conjunctivae are normal.   Neck: Normal range of motion. Neck supple.   Cardiovascular: Normal rate and intact distal pulses.   Pulmonary/Chest: Effort normal. No respiratory distress.   Abdominal: Soft. There is no tenderness.   Musculoskeletal: Normal range of motion.   Neurological: She is alert and oriented to person, place, and time.   Skin: Skin is warm and dry. Capillary refill takes less than 2 seconds.   Psychiatric: She has a normal mood and affect. Her behavior is normal.         ED Course   Procedures  Labs Reviewed - No data to display  EKG Readings: (Independently Interpreted)   Initial Reading: No STEMI. Rhythm: Normal Sinus Rhythm. Heart Rate: 71 bpm. Ectopy: No Ectopy. Conduction: Normal. ST Segments: Normal ST Segments. T Waves: Normal. Clinical Impression: Normal Sinus Rhythm     ECG Results          EKG 12-lead (In process)  Result time 02/10/20 10:04:02    In process by Interface, Lab In [facility] (02/10/20 10:04:02)                 Narrative:    Test Reason : R07.9,    Vent. Rate  : 071 BPM     Atrial Rate : 071 BPM     P-R Int : 156 ms          QRS Dur : 090 ms      QT Int : 394 ms       P-R-T Axes : 044 041 045 degrees     QTc Int : 428 ms    Normal sinus rhythm  Normal ECG  When compared with ECG of 17-JUL-2017 17:52,  T wave amplitude has increased in Anterior leads    Referred By: [email or web address]   SELF           Confirmed By:                             Imaging Results    None          Medical Decision Making:   History:   Old Medical Records: I decided to obtain old medical records.  Independently Interpreted Test(s):   I have ordered and independently interpreted EKG Reading(s) - see prior notes  Clinical Tests:   Medical Tests: Ordered and Reviewed  ED Management:  This patient presents with reproducible pain in her anterior chest wall consistent with pleurisy.  The patient has lobe risk probability for cardiopulmonary disorder and this is more consistent with a musculoskeletal complaint.            Scribe Attestation:   Scribe #1: I performed the above scribed service and the documentation accurately describes the services I performed. I attest to the accuracy of the note.    This document was produced by a scribe under my direction and in my presence. I agree with the content of the note and have made any necessary edits.     Shashank Nguyen MD    02/10/2020 6:06 PM                      Clinical Impression:     1. Costochondritis    2. Chest pain                                Shashank Nguyen MD  02/10/20 6693

## 2020-05-28 ENCOUNTER — HOSPITAL ENCOUNTER (EMERGENCY)
Facility: HOSPITAL | Age: 30
Discharge: HOME | End: 2020-05-28
Attending: EMERGENCY MEDICINE
Payer: MEDICAID

## 2020-05-28 VITALS
BODY MASS INDEX: 43.07 KG/M2 | SYSTOLIC BLOOD PRESSURE: 132 MMHG | TEMPERATURE: 98 F | HEIGHT: 66 IN | HEART RATE: 79 BPM | DIASTOLIC BLOOD PRESSURE: 75 MMHG | RESPIRATION RATE: 18 BRPM | WEIGHT: 268 LBS | OXYGEN SATURATION: 98 %

## 2020-05-28 DIAGNOSIS — M25.531 RIGHT WRIST PAIN: ICD-10-CM

## 2020-05-28 DIAGNOSIS — S63.501A SPRAIN OF RIGHT WRIST, INITIAL ENCOUNTER: Primary | ICD-10-CM

## 2020-05-28 LAB
B-HCG UR QL: NEGATIVE
CTP QC/QA: YES

## 2020-05-28 PROCEDURE — 29125 APPL SHORT ARM SPLINT STATIC: CPT | Mod: RT,ER

## 2020-05-28 PROCEDURE — 81025 URINE PREGNANCY TEST: CPT | Mod: ER | Performed by: EMERGENCY MEDICINE

## 2020-05-28 PROCEDURE — 99284 EMERGENCY DEPT VISIT MOD MDM: CPT | Mod: 25,ER

## 2020-05-28 RX ORDER — IBUPROFEN 800 MG/1
800 TABLET ORAL EVERY 6 HOURS PRN
Qty: 20 TABLET | Refills: 0 | OUTPATIENT
Start: 2020-05-28 | End: 2020-10-22
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
## 2020-08-29 ENCOUNTER — HOSPITAL ENCOUNTER (EMERGENCY)
Facility: HOSPITAL | Age: 30
Discharge: HOME | End: 2020-08-29
Attending: EMERGENCY MEDICINE
Payer: MEDICAID

## 2020-08-29 VITALS
HEART RATE: 94 BPM | OXYGEN SATURATION: 100 % | TEMPERATURE: 99 F | RESPIRATION RATE: 16 BRPM | SYSTOLIC BLOOD PRESSURE: 126 MMHG | DIASTOLIC BLOOD PRESSURE: 81 MMHG | BODY MASS INDEX: 40.18 KG/M2 | WEIGHT: 250 LBS | HEIGHT: 66 IN

## 2020-08-29 DIAGNOSIS — S60.222A CONTUSION OF LEFT HAND, INITIAL ENCOUNTER: Primary | ICD-10-CM

## 2020-08-29 DIAGNOSIS — S60.512A ABRASION OF LEFT HAND, INITIAL ENCOUNTER: ICD-10-CM

## 2020-08-29 DIAGNOSIS — S69.90XA WRIST INJURY: ICD-10-CM

## 2020-08-29 LAB
B-HCG UR QL: NEGATIVE
CTP QC/QA: YES

## 2020-08-29 PROCEDURE — 99283 EMERGENCY DEPT VISIT LOW MDM: CPT | Mod: 25,ER

## 2020-08-29 PROCEDURE — 81025 URINE PREGNANCY TEST: CPT | Mod: ER | Performed by: EMERGENCY MEDICINE

## 2020-08-29 RX ORDER — AMOXICILLIN AND CLAVULANATE POTASSIUM 875; 125 MG/1; MG/1
1 TABLET, FILM COATED ORAL 2 TIMES DAILY
Qty: 20 TABLET | Refills: 0 | Status: SHIPPED | OUTPATIENT
Start: 2020-08-29 | End: 2021-01-19
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
## 2020-10-22 ENCOUNTER — HOSPITAL ENCOUNTER (EMERGENCY)
Facility: HOSPITAL | Age: 30
Discharge: HOME | End: 2020-10-22
Attending: EMERGENCY MEDICINE
Payer: MEDICAID

## 2020-10-22 VITALS
DIASTOLIC BLOOD PRESSURE: 70 MMHG | BODY MASS INDEX: 43.07 KG/M2 | RESPIRATION RATE: 18 BRPM | OXYGEN SATURATION: 100 % | HEIGHT: 66 IN | HEART RATE: 76 BPM | SYSTOLIC BLOOD PRESSURE: 140 MMHG | TEMPERATURE: 98 F | WEIGHT: 268 LBS

## 2020-10-22 DIAGNOSIS — N83.201 OVARIAN CYST, RIGHT: ICD-10-CM

## 2020-10-22 DIAGNOSIS — N20.0 NEPHROLITH: ICD-10-CM

## 2020-10-22 DIAGNOSIS — R10.2 SUPRAPUBIC PAIN: Primary | ICD-10-CM

## 2020-10-22 DIAGNOSIS — R16.0 HEPATOMEGALY: ICD-10-CM

## 2020-10-22 LAB
B-HCG UR QL: NEGATIVE
BILIRUBIN, POC UA: NEGATIVE
BLOOD, POC UA: NEGATIVE
CLARITY, POC UA: CLEAR
COLOR, POC UA: YELLOW
CTP QC/QA: YES
GLUCOSE, POC UA: NEGATIVE
KETONES, POC UA: NEGATIVE
LEUKOCYTE EST, POC UA: NEGATIVE
NITRITE, POC UA: NEGATIVE
PH UR STRIP: 7 [PH]
PROTEIN, POC UA: NEGATIVE
SPECIFIC GRAVITY, POC UA: 1.02
UROBILINOGEN, POC UA: 0.2 E.U./DL

## 2020-10-22 PROCEDURE — 81025 URINE PREGNANCY TEST: CPT | Mod: ER | Performed by: EMERGENCY MEDICINE

## 2020-10-22 PROCEDURE — 81003 URINALYSIS AUTO W/O SCOPE: CPT | Mod: ER

## 2020-10-22 PROCEDURE — 82962 GLUCOSE BLOOD TEST: CPT | Mod: ER

## 2020-10-22 PROCEDURE — 63600175 PHARM REV CODE 636 W HCPCS: Mod: ER | Performed by: PHYSICIAN ASSISTANT

## 2020-10-22 PROCEDURE — 87491 CHLMYD TRACH DNA AMP PROBE: CPT

## 2020-10-22 PROCEDURE — 25000003 PHARM REV CODE 250: Mod: ER | Performed by: PHYSICIAN ASSISTANT

## 2020-10-22 PROCEDURE — 99285 EMERGENCY DEPT VISIT HI MDM: CPT | Mod: 25,ER

## 2020-10-22 RX ORDER — KETOROLAC TROMETHAMINE 30 MG/ML
15 INJECTION, SOLUTION INTRAMUSCULAR; INTRAVENOUS
Status: COMPLETED | OUTPATIENT
Start: 2020-10-22 | End: 2020-10-22

## 2020-10-22 RX ORDER — IBUPROFEN 600 MG/1
600 TABLET ORAL EVERY 6 HOURS PRN
Qty: 20 TABLET | Refills: 0 | OUTPATIENT
Start: 2020-10-22 | End: 2021-08-10

## 2020-10-22 RX ORDER — ACETAMINOPHEN 325 MG/1
650 TABLET ORAL
Status: COMPLETED | OUTPATIENT
Start: 2020-10-22 | End: 2020-10-22

## 2020-10-22 RX ADMIN — KETOROLAC TROMETHAMINE 15 MG: 30 INJECTION, SOLUTION INTRAMUSCULAR at 03:10

## 2020-10-22 RX ADMIN — ACETAMINOPHEN 650 MG: 325 TABLET ORAL at 03:10

## 2020-10-22 NOTE — ED TRIAGE NOTES
Pt presents to the ER with lower abd pain that radiates to left flank pain. Pt also reports dysuria and a feeling of not emptying her bladder after voiding. Pt denies denies.

## 2020-10-22 NOTE — ED PROVIDER NOTES
Encounter Date: 10/22/2020    SCRIBE #1 NOTE: I, Presley Rodriguez, am scribing for, and in the presence of,  SARA Ramos. I have scribed the following portions of the note - Other sections scribed: HPI, ROS, PE.       History     Chief Complaint   Patient presents with    Abdominal Pain     with dysuria and back pain for 2 days. Reports 10 days late on menstral cycle but had a tubal in 2013     Rachael Moeller is a 29 y.o. female who presents to the ED complaining of constant dull achy lower abdominal pain that progressed into pain that now radiates to lower back for 2 days. Pain is worse with urination and intercourse. Denies fever, vomiting, or any vaginal discharge. No PMHx. Last menstrual cycle was on Sept 14, 2020.  Took tylenol yesterday, no medications tried today. Patient had recent unprotected intercourse with 1 partner.    The history is provided by the patient. No  was used.     Review of patient's allergies indicates:  No Known Allergies  Past Medical History:   Diagnosis Date    Anemia     Arthritis     Asthma     Carpal tunnel syndrome     Complication of anesthesia     one sided epidural     Past Surgical History:   Procedure Laterality Date    TUBAL LIGATION       Family History   Problem Relation Age of Onset    Hypertension Maternal Grandmother     Hyperlipidemia Maternal Grandmother     Breast cancer Maternal Grandmother         dx in age - unknown    Hypertension Mother     Asthma Sister     Asthma Son     Heart disease Maternal Aunt     Cancer Maternal Uncle     Colon cancer Maternal Uncle     Ovarian cancer Neg Hx      Social History     Tobacco Use    Smoking status: Never Smoker    Smokeless tobacco: Never Used   Substance Use Topics    Alcohol use: Yes     Alcohol/week: 2.0 standard drinks     Types: 2 Shots of liquor per week     Comment: occasional    Drug use: No     Review of Systems   Constitutional: Negative for fever.   Gastrointestinal:  Positive for abdominal pain (lower). Negative for nausea and vomiting.   Genitourinary: Positive for dyspareunia. Negative for vaginal discharge.   Musculoskeletal: Positive for back pain (lower).   All other systems reviewed and are negative.      Physical Exam     Initial Vitals [10/22/20 1429]   BP Pulse Resp Temp SpO2   (!) 139/96 87 16 98.3 °F (36.8 °C) 99 %      MAP       --         Physical Exam    Nursing note and vitals reviewed.  Constitutional: She appears well-developed and well-nourished.   HENT:   Head: Normocephalic and atraumatic.   Eyes: Conjunctivae are normal.   Neck: Normal range of motion. Neck supple.   Cardiovascular: Normal rate and intact distal pulses.   Pulmonary/Chest: Effort normal. No respiratory distress.   Abdominal: Soft. Normal appearance. She exhibits no distension and no mass. There is abdominal tenderness (mild) in the suprapubic area. There is no rigidity, no rebound, no guarding and no CVA tenderness.   Genitourinary:    Vagina normal.      Pelvic exam was performed with patient supine.   Cervix exhibits no motion tenderness, no discharge and no friability. Right adnexum displays no mass. Left adnexum displays no mass.    No vaginal discharge or bleeding.   No bleeding in the vagina.    Genitourinary Comments: Chaperoned by Betsy Acosta RN.     Musculoskeletal: Normal range of motion.   Neurological: She is alert and oriented to person, place, and time.   Skin: Skin is warm and dry.   Psychiatric: She has a normal mood and affect.         ED Course   Procedures  Labs Reviewed   C. TRACHOMATIS/N. GONORRHOEAE BY AMP DNA   POCT URINE PREGNANCY   POCT URINALYSIS W/O SCOPE   POCT URINALYSIS W/O SCOPE          Imaging Results          CT Renal Stone Study ABD Pelvis WO (Final result)  Result time 10/22/20 16:03:30    Final result by Ruperto Keen MD (10/22/20 16:03:30)                 Impression:      1. Unchanged right renal 3 mm nonobstructing nephrolith.  2. Hepatomegaly.  3.  Right adnexal 2.1 cm hypodense area suggestive of a small ovarian cyst.      Electronically signed by: Ruperto Keen MD  Date:    10/22/2020  Time:    16:03             Narrative:    EXAMINATION:  CT RENAL STONE STUDY ABD PELVIS WO    CLINICAL HISTORY:  Flank pain, kidney stone suspected;Hematuria, unknown cause;    TECHNIQUE:  Low dose axial images, sagittal and coronal reformations were obtained from the lung bases to the pubic symphysis.  Contrast was not administered.    COMPARISON:  CT abdomen and pelvis 05/11/2018    FINDINGS:  Imaged lung bases show mild dependent atelectasis.  Base of the heart is within normal limits.    Liver is enlarged without focal process seen.  Noncontrast appearance of the gallbladder, pancreas, spleen, stomach, duodenum and bilateral adrenal glands are within normal limits.  No biliary ductal dilatation.    Bilateral kidneys are normal in size, shape and location.  There is a 3 mm nephrolith at the right renal lower pole.  No radiodense calculus seen within the right ureter, left collecting system or urinary bladder.  No hydronephrosis or significant perinephric stranding.  Ureters are nondilated.  Urinary bladder is within normal limits.  There is a 2.1 cm oval-shaped hypodense area at the right adnexal region suggestive of a small cyst.  Noncontrast appearance of the uterus and left adnexal region are grossly within normal limits.    No ascites, free air or lymphadenopathy.  No significant atherosclerosis.  Aorta tapers normally.    Small fat containing umbilical hernia.  Appendix and terminal ileum are within normal limits.  Mild amount of scattered fecal material throughout the colon.  No evidence of bowel obstruction or inflammation.  No pneumatosis or portal venous gas.    Osseous structures show minimal degenerative change without acute or destructive process seen.                              X-Rays:   Independently Interpreted Readings:   Abdomen: No evidence of obstructive  uropathy.  Right adnexal hypodense area suggestive of cyst, 2.1 cm.  Hepatomegaly.  Right nonobstructing nephrolith     Medical Decision Making:   History:   Old Medical Records: I decided to obtain old medical records.  Clinical Tests:   Lab Tests: Ordered and Reviewed  ED Management:  29-year-old female with lower abdominal pain and lower back pain.  She is afebrile.  No CVA tenderness.  Low suspicion for pyelonephritis.  UA without evidence for infection.  CT without evidence of stone.  Pelvic exam without evidence of PID.  Low suspicion for TOA.  Will follow up with gonorrhea chlamydia.  Patient made aware of delay on this specific test result.  Incidental findings also discussed.            Scribe Attestation:   Scribe #1: I performed the above scribed service and the documentation accurately describes the services I performed. I attest to the accuracy of the note.    Scribe attestation: I, Adama Cárdenas, personally performed the services described in this documentation.  All medical record entries made by the scribe were at my direction and in my presence.  I have reviewed the chart and agree that the record reflects my personal performance and is accurate and complete.                    Clinical Impression:     ICD-10-CM ICD-9-CM   1. Suprapubic pain  R10.2 789.09   2. Ovarian cyst, right  N83.201 620.2   3. Nephrolith  N20.0 592.0   4. Hepatomegaly  R16.0 789.1                          ED Disposition Condition    Discharge Stable        ED Prescriptions     Medication Sig Dispense Start Date End Date Auth. Provider    ibuprofen (ADVIL,MOTRIN) 600 MG tablet Take 1 tablet (600 mg total) by mouth every 6 (six) hours as needed for Pain. 20 tablet 10/22/2020  Adama Cárdenas PA-C        Follow-up Information     Follow up With Specialties Details Why Contact Info    Primary care provider  Schedule an appointment as soon as possible for a visit       [facility] Emergency [facility] Go to  If  symptoms worsen [address] [facility] [address] [phone]                                       ALLISON TolentinoC  10/22/20 2043

## 2020-10-27 LAB
C TRACH DNA SPEC QL NAA+PROBE: NOT DETECTED
N GONORRHOEA DNA SPEC QL NAA+PROBE: NOT DETECTED

## 2020-11-17 ENCOUNTER — HOSPITAL ENCOUNTER (EMERGENCY)
Facility: HOSPITAL | Age: 30
Discharge: HOME | End: 2020-11-18
Attending: EMERGENCY MEDICINE
Payer: MEDICAID

## 2020-11-17 DIAGNOSIS — J45.901 EXACERBATION OF ASTHMA, UNSPECIFIED ASTHMA SEVERITY, UNSPECIFIED WHETHER PERSISTENT: Primary | ICD-10-CM

## 2020-11-17 DIAGNOSIS — R07.89 CHEST DISCOMFORT: ICD-10-CM

## 2020-11-17 LAB
B-HCG UR QL: NEGATIVE
CTP QC/QA: YES

## 2020-11-17 PROCEDURE — 93010 ELECTROCARDIOGRAM REPORT: CPT | Mod: ,,, | Performed by: INTERNAL MEDICINE

## 2020-11-17 PROCEDURE — 99284 EMERGENCY DEPT VISIT MOD MDM: CPT | Mod: 25,ER

## 2020-11-17 PROCEDURE — 63600175 PHARM REV CODE 636 W HCPCS: Mod: ER | Performed by: EMERGENCY MEDICINE

## 2020-11-17 PROCEDURE — 94640 AIRWAY INHALATION TREATMENT: CPT | Mod: ER

## 2020-11-17 PROCEDURE — 81025 URINE PREGNANCY TEST: CPT | Mod: ER | Performed by: EMERGENCY MEDICINE

## 2020-11-17 PROCEDURE — 93005 ELECTROCARDIOGRAM TRACING: CPT | Mod: ER

## 2020-11-17 PROCEDURE — 93010 EKG 12-LEAD: ICD-10-PCS | Mod: ,,, | Performed by: INTERNAL MEDICINE

## 2020-11-17 RX ORDER — PREDNISONE 20 MG/1
40 TABLET ORAL
Status: COMPLETED | OUTPATIENT
Start: 2020-11-17 | End: 2020-11-17

## 2020-11-17 RX ORDER — IPRATROPIUM BROMIDE AND ALBUTEROL SULFATE 2.5; .5 MG/3ML; MG/3ML
3 SOLUTION RESPIRATORY (INHALATION) ONCE
Status: COMPLETED | OUTPATIENT
Start: 2020-11-18 | End: 2020-11-17

## 2020-11-17 RX ADMIN — IPRATROPIUM BROMIDE AND ALBUTEROL SULFATE 3 ML: .5; 3 SOLUTION RESPIRATORY (INHALATION) at 11:11

## 2020-11-17 RX ADMIN — PREDNISONE 40 MG: 20 TABLET ORAL at 11:11

## 2020-11-18 ENCOUNTER — TELEPHONE (OUTPATIENT)
Dept: EMERGENCY MEDICINE | Facility: HOSPITAL | Age: 30
End: 2020-11-18

## 2020-11-18 VITALS
HEART RATE: 92 BPM | BODY MASS INDEX: 42.59 KG/M2 | OXYGEN SATURATION: 100 % | TEMPERATURE: 99 F | DIASTOLIC BLOOD PRESSURE: 72 MMHG | RESPIRATION RATE: 18 BRPM | HEIGHT: 66 IN | SYSTOLIC BLOOD PRESSURE: 138 MMHG | WEIGHT: 265 LBS

## 2020-11-18 PROCEDURE — 25000242 PHARM REV CODE 250 ALT 637 W/ HCPCS: Mod: ER | Performed by: EMERGENCY MEDICINE

## 2020-11-18 RX ORDER — PREDNISONE 20 MG/1
40 TABLET ORAL DAILY
Qty: 10 TABLET | Refills: 0 | Status: SHIPPED | OUTPATIENT
Start: 2020-11-18 | End: 2020-11-23

## 2020-11-18 RX ORDER — IPRATROPIUM BROMIDE AND ALBUTEROL SULFATE 2.5; .5 MG/3ML; MG/3ML
3 SOLUTION RESPIRATORY (INHALATION) EVERY 6 HOURS PRN
Qty: 1 BOX | Refills: 0 | Status: SHIPPED | OUTPATIENT
Start: 2020-11-18 | End: 2021-11-16 | Stop reason: ALTCHOICE

## 2020-11-18 NOTE — ED PROVIDER NOTES
Encounter Date: 11/17/2020    SCRIBE #1 NOTE: I, Geno Ho, am scribing for, and in the presence of,  Dr. Nguyen. I have scribed the following portions of the note - Other sections scribed: HPI, ROS, PE.       History     Chief Complaint   Patient presents with    Chest Pain     reports all symptoms started 2 days ago. Hx of asthma. Last inhaler use was this afternoon with no relief.    Cough    Vomiting    Diarrhea    Shortness of Breath     Rachael Moeller is a 30 y.o. female with asthma who presents to the ED complaining of sharp chest pain with an associated dry cough onset 2 days ago. Reports using her inhaler this afternoon with no relief. Also complains of a sore throat.    The history is provided by the patient. No  was used.     Review of patient's allergies indicates:  No Known Allergies  Past Medical History:   Diagnosis Date    Anemia     Arthritis     Asthma     Carpal tunnel syndrome     Complication of anesthesia     one sided epidural     Past Surgical History:   Procedure Laterality Date    TUBAL LIGATION       Family History   Problem Relation Age of Onset    Hypertension Maternal Grandmother     Hyperlipidemia Maternal Grandmother     Breast cancer Maternal Grandmother         dx in age - unknown    Hypertension Mother     Asthma Sister     Asthma Son     Heart disease Maternal Aunt     Cancer Maternal Uncle     Colon cancer Maternal Uncle     Ovarian cancer Neg Hx      Social History     Tobacco Use    Smoking status: Never Smoker    Smokeless tobacco: Never Used   Substance Use Topics    Alcohol use: Yes     Alcohol/week: 2.0 standard drinks     Types: 2 Shots of liquor per week     Comment: occasional    Drug use: No     Review of Systems   Constitutional: Negative.    HENT: Positive for postnasal drip and sore throat.    Eyes: Negative.    Respiratory: Positive for cough, chest tightness and shortness of breath.    Cardiovascular:  Negative.    Gastrointestinal: Positive for diarrhea (remote) and vomiting (remote).   Endocrine: Negative.    Genitourinary: Negative.    Musculoskeletal: Negative.    Skin: Negative.    Allergic/Immunologic: Negative.    Neurological: Negative.    Hematological: Negative.    Psychiatric/Behavioral: Negative.    All other systems reviewed and are negative.      Physical Exam     Initial Vitals [11/17/20 2314]   BP Pulse Resp Temp SpO2   132/77 82 20 99.2 °F (37.3 °C) 97 %      MAP       --         Physical Exam    Nursing note and vitals reviewed.  Constitutional: She appears well-developed and well-nourished. No distress.   HENT:   Head: Normocephalic and atraumatic.   Right Ear: External ear normal.   Left Ear: External ear normal.   Postnasal drip noted.   Eyes: Conjunctivae are normal.   Neck: Normal range of motion. Neck supple.   Cardiovascular: Normal rate and regular rhythm.   Pulmonary/Chest: Effort normal. No respiratory distress. She has wheezes in the right lower field and the left lower field.   Abdominal: Soft. Normal appearance. There is no abdominal tenderness.   Musculoskeletal: Normal range of motion.   Neurological: She is alert and oriented to person, place, and time.   Skin: Skin is warm and dry. No rash noted.   Psychiatric: She has a normal mood and affect. Her behavior is normal.         ED Course   Procedures  Labs Reviewed   POCT URINE PREGNANCY     EKG Readings: (Independently Interpreted)   Initial Reading: No STEMI. Rhythm: Normal Sinus Rhythm. Heart Rate: 83. Ectopy: No Ectopy. Conduction: Normal. ST Segments: Normal ST Segments. T Waves: Normal. Clinical Impression: Normal Sinus Rhythm       Imaging Results    None          Medical Decision Making:   History:   Old Medical Records: I decided to obtain old medical records.  Independently Interpreted Test(s):   I have ordered and independently interpreted EKG Reading(s) - see prior notes  Clinical Tests:   Lab Tests: Ordered and  Reviewed  Medical Tests: Ordered and Reviewed            Scribe Attestation:   Scribe #1: I performed the above scribed service and the documentation accurately describes the services I performed. I attest to the accuracy of the note.    This document was produced by a scribe under my direction and in my presence. I agree with the content of the note and have made any necessary edits.     Shashank Nguyen MD    11/18/2020 12:45 AM                    Clinical Impression:       ICD-10-CM ICD-9-CM   1. Exacerbation of asthma, unspecified asthma severity, unspecified whether persistent  J45.901 493.92   2. Chest discomfort  R07.89 786.59                          ED Disposition Condition    Discharge Stable        ED Prescriptions     Medication Sig Dispense Start Date End Date Auth. Provider    albuterol-ipratropium (DUO-NEB) 2.5 mg-0.5 mg/3 mL nebulizer solution Take 3 mLs by nebulization every 6 (six) hours as needed for Wheezing. Rescue 1 Box 11/18/2020 11/18/2021 Shashank Nguyen MD    predniSONE (DELTASONE) 20 MG tablet Take 2 tablets (40 mg total) by mouth once daily. for 5 days 10 tablet 11/18/2020 11/23/2020 Shashank Nguyen MD        Follow-up Information     Follow up With Specialties Details Why Contact Info    [facility] Emergency Medicine  As needed [address] [facility] [address] [phone]                                       Shashank Nguyen MD  11/18/20 0045

## 2020-11-18 NOTE — ED TRIAGE NOTES
Pt presents to ER with c/o tightness in chest accompanied by coughing, wheezing, and sob for 2 days.  She has been having asthma and has use her inhaler with no relief. She reports episodes of vomiting with coughing.  Had a similar episode while I was in the room. Assessing her.

## 2020-12-07 ENCOUNTER — TELEPHONE (OUTPATIENT)
Dept: OBSTETRICS AND GYNECOLOGY | Facility: CLINIC | Age: 30
End: 2020-12-07

## 2020-12-07 NOTE — TELEPHONE ENCOUNTER
----- Message from Ignacia Verdugo sent at 12/7/2020  9:40 AM CST -----  Regarding: sooner appointment  Contact: [phone]  Type:  Sooner Apoointment Request    Caller is requesting a sooner appointment.  Caller declined first available appointment listed below.  Caller will not accept being placed on the waitlist and is requesting a message be sent to doctor.  Name of Caller:Rachael  When is the first available appointment?01/19/21  Symptoms:pt prefers to discuss them with the Dr  Would the patient rather a call back or a response via [facility]? Call back  Best Call Back Number:[phone]  Additional Information:

## 2020-12-07 NOTE — TELEPHONE ENCOUNTER
"Patient would like to discuss the tubal Dr Moore did in 2013. 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 Patient was advised Dr Moore does not have an earlier appointment at this time. Patient states she will wait.   "

## 2021-01-19 ENCOUNTER — OFFICE VISIT (OUTPATIENT)
Dept: OBSTETRICS AND GYNECOLOGY | Facility: CLINIC | Age: 31
End: 2021-01-19
Attending: OBSTETRICS & GYNECOLOGY
Payer: MEDICAID

## 2021-01-19 VITALS
BODY MASS INDEX: 46.58 KG/M2 | SYSTOLIC BLOOD PRESSURE: 128 MMHG | DIASTOLIC BLOOD PRESSURE: 86 MMHG | HEIGHT: 65 IN | WEIGHT: 279.56 LBS

## 2021-01-19 DIAGNOSIS — Z01.419 WOMEN'S ANNUAL ROUTINE GYNECOLOGICAL EXAMINATION: Primary | ICD-10-CM

## 2021-01-19 DIAGNOSIS — Z98.51 HISTORY OF BILATERAL TUBAL LIGATION: ICD-10-CM

## 2021-01-19 DIAGNOSIS — N76.0 ACUTE VAGINITIS: ICD-10-CM

## 2021-01-19 DIAGNOSIS — Z30.014 ENCOUNTER FOR INITIAL PRESCRIPTION OF INTRAUTERINE CONTRACEPTIVE DEVICE (IUD): ICD-10-CM

## 2021-01-19 DIAGNOSIS — Z12.4 PAP SMEAR FOR CERVICAL CANCER SCREENING: ICD-10-CM

## 2021-01-19 DIAGNOSIS — N92.6 IRREGULAR BLEEDING: ICD-10-CM

## 2021-01-19 LAB
B-HCG UR QL: NEGATIVE
CTP QC/QA: YES

## 2021-01-19 PROCEDURE — 99213 OFFICE O/P EST LOW 20 MIN: CPT | Mod: PBBFAC | Performed by: OBSTETRICS & GYNECOLOGY

## 2021-01-19 PROCEDURE — 88175 CYTOPATH C/V AUTO FLUID REDO: CPT

## 2021-01-19 PROCEDURE — 99385 PR PREVENTIVE VISIT,NEW,18-39: ICD-10-PCS | Mod: S$PBB,,, | Performed by: OBSTETRICS & GYNECOLOGY

## 2021-01-19 PROCEDURE — 99999 PR PBB SHADOW E&M-EST. PATIENT-LVL III: CPT | Mod: PBBFAC,,, | Performed by: OBSTETRICS & GYNECOLOGY

## 2021-01-19 PROCEDURE — 87624 HPV HI-RISK TYP POOLED RSLT: CPT

## 2021-01-19 PROCEDURE — 99999 PR PBB SHADOW E&M-EST. PATIENT-LVL III: ICD-10-PCS | Mod: PBBFAC,,, | Performed by: OBSTETRICS & GYNECOLOGY

## 2021-01-19 PROCEDURE — 87660 TRICHOMONAS VAGIN DIR PROBE: CPT

## 2021-01-19 PROCEDURE — 99385 PREV VISIT NEW AGE 18-39: CPT | Mod: S$PBB,,, | Performed by: OBSTETRICS & GYNECOLOGY

## 2021-01-19 PROCEDURE — 87510 GARDNER VAG DNA DIR PROBE: CPT

## 2021-01-19 RX ORDER — ALBUTEROL SULFATE 0.63 MG/3ML
SOLUTION RESPIRATORY (INHALATION)
COMMUNITY
End: 2022-12-27

## 2021-01-20 ENCOUNTER — PATIENT MESSAGE (OUTPATIENT)
Dept: OBSTETRICS AND GYNECOLOGY | Facility: CLINIC | Age: 31
End: 2021-01-20

## 2021-01-20 ENCOUNTER — TELEPHONE (OUTPATIENT)
Dept: OBSTETRICS AND GYNECOLOGY | Facility: CLINIC | Age: 31
End: 2021-01-20

## 2021-01-20 LAB
C TRACH RRNA SPEC QL NAA+PROBE: NEGATIVE
CANDIDA RRNA VAG QL PROBE: NEGATIVE
G VAGINALIS RRNA GENITAL QL PROBE: POSITIVE
N GONORRHOEA RRNA SPEC QL NAA+PROBE: NEGATIVE
T VAGINALIS RRNA GENITAL QL PROBE: NEGATIVE

## 2021-01-20 RX ORDER — METRONIDAZOLE 500 MG/1
500 TABLET ORAL 2 TIMES DAILY
Qty: 14 TABLET | Refills: 0 | Status: SHIPPED | OUTPATIENT
Start: 2021-01-20 | End: 2021-01-27

## 2021-01-27 LAB
HPV HR 12 DNA SPEC QL NAA+PROBE: NEGATIVE
HPV16 AG SPEC QL: NEGATIVE
HPV18 DNA SPEC QL NAA+PROBE: NEGATIVE

## 2021-02-02 ENCOUNTER — TELEPHONE (OUTPATIENT)
Dept: OBSTETRICS AND GYNECOLOGY | Facility: CLINIC | Age: 31
End: 2021-02-02

## 2021-02-02 DIAGNOSIS — R10.2 PELVIC PAIN: Primary | ICD-10-CM

## 2021-02-03 ENCOUNTER — PATIENT MESSAGE (OUTPATIENT)
Dept: OBSTETRICS AND GYNECOLOGY | Facility: CLINIC | Age: 31
End: 2021-02-03

## 2021-02-08 ENCOUNTER — HOSPITAL ENCOUNTER (OUTPATIENT)
Dept: RADIOLOGY | Facility: OTHER | Age: 31
Discharge: HOME | End: 2021-02-08
Payer: MEDICAID

## 2021-02-08 DIAGNOSIS — R10.2 PELVIC PAIN: ICD-10-CM

## 2021-02-08 PROCEDURE — 76856 US EXAM PELVIC COMPLETE: CPT | Mod: TC

## 2021-02-08 PROCEDURE — 76856 US EXAM PELVIC COMPLETE: CPT | Mod: 26,,, | Performed by: RADIOLOGY

## 2021-02-08 PROCEDURE — 76856 US PELVIS COMPLETE NON OB: ICD-10-PCS | Mod: 26,,, | Performed by: RADIOLOGY

## 2021-02-12 ENCOUNTER — PATIENT MESSAGE (OUTPATIENT)
Dept: OBSTETRICS AND GYNECOLOGY | Facility: CLINIC | Age: 31
End: 2021-02-12

## 2021-02-12 LAB
FINAL PATHOLOGIC DIAGNOSIS: NORMAL
Lab: NORMAL

## 2021-02-25 ENCOUNTER — PATIENT MESSAGE (OUTPATIENT)
Dept: OBSTETRICS AND GYNECOLOGY | Facility: CLINIC | Age: 31
End: 2021-02-25

## 2021-02-26 ENCOUNTER — PATIENT MESSAGE (OUTPATIENT)
Dept: OBSTETRICS AND GYNECOLOGY | Facility: CLINIC | Age: 31
End: 2021-02-26

## 2021-02-26 DIAGNOSIS — N39.0 URINARY TRACT INFECTION WITHOUT HEMATURIA, SITE UNSPECIFIED: Primary | ICD-10-CM

## 2021-03-06 ENCOUNTER — PATIENT MESSAGE (OUTPATIENT)
Dept: OBSTETRICS AND GYNECOLOGY | Facility: CLINIC | Age: 31
End: 2021-03-06

## 2021-03-09 ENCOUNTER — PROCEDURE VISIT (OUTPATIENT)
Dept: OBSTETRICS AND GYNECOLOGY | Facility: CLINIC | Age: 31
End: 2021-03-09
Attending: OBSTETRICS & GYNECOLOGY
Payer: MEDICAID

## 2021-03-09 VITALS
SYSTOLIC BLOOD PRESSURE: 120 MMHG | HEIGHT: 66 IN | BODY MASS INDEX: 45.7 KG/M2 | DIASTOLIC BLOOD PRESSURE: 76 MMHG | WEIGHT: 284.38 LBS

## 2021-03-09 DIAGNOSIS — Z30.430 ENCOUNTER FOR IUD INSERTION: Primary | ICD-10-CM

## 2021-03-09 PROCEDURE — 58300 INSERT INTRAUTERINE DEVICE: CPT | Mod: S$PBB,,, | Performed by: OBSTETRICS & GYNECOLOGY

## 2021-03-09 PROCEDURE — 58300 PR INSERT INTRAUTERINE DEVICE: ICD-10-PCS | Mod: S$PBB,,, | Performed by: OBSTETRICS & GYNECOLOGY

## 2021-03-09 PROCEDURE — 81025 URINE PREGNANCY TEST: CPT | Mod: PBBFAC | Performed by: OBSTETRICS & GYNECOLOGY

## 2021-03-09 PROCEDURE — 58300 INSERT INTRAUTERINE DEVICE: CPT | Mod: PBBFAC | Performed by: OBSTETRICS & GYNECOLOGY

## 2021-03-09 RX ADMIN — LEVONORGESTREL 1 INTRA UTERINE DEVICE: 52 INTRAUTERINE DEVICE INTRAUTERINE at 05:03

## 2021-03-10 LAB
B-HCG UR QL: NEGATIVE
CTP QC/QA: YES

## 2021-04-16 ENCOUNTER — PATIENT MESSAGE (OUTPATIENT)
Dept: RESEARCH | Facility: HOSPITAL | Age: 31
End: 2021-04-16

## 2021-05-24 ENCOUNTER — HOSPITAL ENCOUNTER (EMERGENCY)
Facility: HOSPITAL | Age: 31
Discharge: HOME | End: 2021-05-24
Attending: EMERGENCY MEDICINE
Payer: MEDICAID

## 2021-05-24 VITALS
RESPIRATION RATE: 18 BRPM | OXYGEN SATURATION: 99 % | WEIGHT: 286 LBS | DIASTOLIC BLOOD PRESSURE: 93 MMHG | BODY MASS INDEX: 46.16 KG/M2 | HEART RATE: 80 BPM | TEMPERATURE: 99 F | SYSTOLIC BLOOD PRESSURE: 142 MMHG

## 2021-05-24 DIAGNOSIS — J06.9 VIRAL URI WITH COUGH: Primary | ICD-10-CM

## 2021-05-24 DIAGNOSIS — Z87.09 HISTORY OF ASTHMA: ICD-10-CM

## 2021-05-24 LAB
B-HCG UR QL: NEGATIVE
CTP QC/QA: YES
CTP QC/QA: YES
SARS-COV-2 RDRP RESP QL NAA+PROBE: NEGATIVE

## 2021-05-24 PROCEDURE — 81025 URINE PREGNANCY TEST: CPT | Mod: ER | Performed by: EMERGENCY MEDICINE

## 2021-05-24 PROCEDURE — 99284 EMERGENCY DEPT VISIT MOD MDM: CPT | Mod: 25,ER

## 2021-05-24 PROCEDURE — U0002 COVID-19 LAB TEST NON-CDC: HCPCS | Mod: ER | Performed by: PHYSICIAN ASSISTANT

## 2021-05-24 PROCEDURE — 25000003 PHARM REV CODE 250: Mod: ER | Performed by: PHYSICIAN ASSISTANT

## 2021-05-24 RX ORDER — ACETAMINOPHEN 500 MG
1000 TABLET ORAL
Status: COMPLETED | OUTPATIENT
Start: 2021-05-24 | End: 2021-05-24

## 2021-05-24 RX ORDER — BENZONATATE 100 MG/1
100 CAPSULE ORAL 3 TIMES DAILY PRN
Qty: 20 CAPSULE | Refills: 0 | Status: SHIPPED | OUTPATIENT
Start: 2021-05-24 | End: 2021-06-03

## 2021-05-24 RX ORDER — PREDNISONE 20 MG/1
40 TABLET ORAL DAILY
Qty: 6 TABLET | Refills: 0 | Status: SHIPPED | OUTPATIENT
Start: 2021-05-24 | End: 2021-05-27

## 2021-05-24 RX ORDER — ALBUTEROL SULFATE 90 UG/1
1-2 AEROSOL, METERED RESPIRATORY (INHALATION) EVERY 6 HOURS PRN
Qty: 6.7 G | Refills: 1 | OUTPATIENT
Start: 2021-05-24 | End: 2022-12-27

## 2021-05-24 RX ADMIN — ACETAMINOPHEN 1000 MG: 500 TABLET, FILM COATED ORAL at 11:05

## 2021-07-19 ENCOUNTER — HOSPITAL ENCOUNTER (EMERGENCY)
Facility: HOSPITAL | Age: 31
Discharge: HOME | End: 2021-07-20
Attending: EMERGENCY MEDICINE
Payer: MEDICAID

## 2021-07-19 VITALS
HEIGHT: 66 IN | HEART RATE: 94 BPM | RESPIRATION RATE: 14 BRPM | SYSTOLIC BLOOD PRESSURE: 134 MMHG | TEMPERATURE: 99 F | WEIGHT: 270 LBS | BODY MASS INDEX: 43.39 KG/M2 | DIASTOLIC BLOOD PRESSURE: 82 MMHG | OXYGEN SATURATION: 100 %

## 2021-07-19 DIAGNOSIS — T07.XXXA MULTIPLE ABRASIONS: ICD-10-CM

## 2021-07-19 DIAGNOSIS — S00.03XA CONTUSION OF SCALP, INITIAL ENCOUNTER: ICD-10-CM
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
## 2021-07-19 LAB
B-HCG UR QL: NEGATIVE
CTP QC/QA: YES

## 2021-07-19 PROCEDURE — 63600175 PHARM REV CODE 636 W HCPCS: Mod: ER | Performed by: EMERGENCY MEDICINE

## 2021-07-19 PROCEDURE — 81025 URINE PREGNANCY TEST: CPT | Mod: ER | Performed by: EMERGENCY MEDICINE

## 2021-07-19 PROCEDURE — 90715 TDAP VACCINE 7 YRS/> IM: CPT | Mod: ER | Performed by: EMERGENCY MEDICINE

## 2021-07-19 PROCEDURE — 96372 THER/PROPH/DIAG INJ SC/IM: CPT | Mod: ER

## 2021-07-19 PROCEDURE — 99284 EMERGENCY DEPT VISIT MOD MDM: CPT | Mod: 25,ER

## 2021-07-19 PROCEDURE — 90471 IMMUNIZATION ADMIN: CPT | Mod: ER | Performed by: EMERGENCY MEDICINE

## 2021-07-19 RX ORDER — NAPROXEN 500 MG/1
500 TABLET ORAL 2 TIMES DAILY WITH MEALS
Qty: 60 TABLET | Refills: 0 | OUTPATIENT
Start: 2021-07-19 | End: 2022-12-27

## 2021-07-19 RX ORDER — METHOCARBAMOL 500 MG/1
500 TABLET, FILM COATED ORAL 2 TIMES DAILY PRN
Qty: 15 TABLET | Refills: 0 | Status: SHIPPED | OUTPATIENT
Start: 2021-07-19 | End: 2021-07-24

## 2021-07-19 RX ORDER — ORPHENADRINE CITRATE 30 MG/ML
60 INJECTION INTRAMUSCULAR; INTRAVENOUS
Status: COMPLETED | OUTPATIENT
Start: 2021-07-19 | End: 2021-07-19

## 2021-07-19 RX ORDER — KETOROLAC TROMETHAMINE 30 MG/ML
15 INJECTION, SOLUTION INTRAMUSCULAR; INTRAVENOUS
Status: COMPLETED | OUTPATIENT
Start: 2021-07-19 | End: 2021-07-19

## 2021-07-19 RX ADMIN — TETANUS TOXOID, REDUCED DIPHTHERIA TOXOID AND ACELLULAR PERTUSSIS VACCINE, ADSORBED 0.5 ML: 5; 2.5; 8; 8; 2.5 SUSPENSION INTRAMUSCULAR at 11:07

## 2021-07-19 RX ADMIN — ORPHENADRINE CITRATE 60 MG: 30 INJECTION INTRAMUSCULAR; INTRAVENOUS at 11:07

## 2021-07-19 RX ADMIN — KETOROLAC TROMETHAMINE 15 MG: 30 INJECTION, SOLUTION INTRAMUSCULAR; INTRAVENOUS at 11:07

## 2021-08-10 ENCOUNTER — HOSPITAL ENCOUNTER (EMERGENCY)
Facility: HOSPITAL | Age: 31
Discharge: HOME | End: 2021-08-10
Payer: MEDICAID

## 2021-08-10 VITALS
RESPIRATION RATE: 20 BRPM | SYSTOLIC BLOOD PRESSURE: 157 MMHG | DIASTOLIC BLOOD PRESSURE: 99 MMHG | HEART RATE: 60 BPM | TEMPERATURE: 98 F | BODY MASS INDEX: 43.39 KG/M2 | OXYGEN SATURATION: 98 % | WEIGHT: 270 LBS | HEIGHT: 66 IN

## 2021-08-10 DIAGNOSIS — S60.142A CONTUSION OF LEFT RING FINGER WITH DAMAGE TO NAIL, INITIAL ENCOUNTER: Primary | ICD-10-CM

## 2021-08-10 LAB
B-HCG UR QL: NEGATIVE
CTP QC/QA: YES

## 2021-08-10 PROCEDURE — 25000003 PHARM REV CODE 250: Mod: ER | Performed by: PHYSICIAN ASSISTANT

## 2021-08-10 PROCEDURE — 81025 URINE PREGNANCY TEST: CPT | Mod: ER | Performed by: EMERGENCY MEDICINE

## 2021-08-10 PROCEDURE — 99284 EMERGENCY DEPT VISIT MOD MDM: CPT | Mod: ER

## 2021-08-10 RX ORDER — CEPHALEXIN 500 MG/1
500 CAPSULE ORAL 4 TIMES DAILY
Qty: 20 CAPSULE | Refills: 0 | Status: SHIPPED | OUTPATIENT
Start: 2021-08-10 | End: 2021-08-15

## 2021-08-10 RX ORDER — MUPIROCIN 20 MG/G
OINTMENT TOPICAL 3 TIMES DAILY
Qty: 15 G | Refills: 0 | Status: SHIPPED | OUTPATIENT
Start: 2021-08-10 | End: 2021-08-17

## 2021-08-10 RX ORDER — ACETAMINOPHEN 500 MG
500 TABLET ORAL EVERY 4 HOURS PRN
Qty: 20 TABLET | Refills: 0 | Status: SHIPPED | OUTPATIENT
Start: 2021-08-10 | End: 2021-08-15

## 2021-08-10 RX ORDER — MUPIROCIN 20 MG/G
OINTMENT TOPICAL
Status: COMPLETED | OUTPATIENT
Start: 2021-08-10 | End: 2021-08-10

## 2021-08-10 RX ORDER — IBUPROFEN 600 MG/1
600 TABLET ORAL EVERY 6 HOURS PRN
Qty: 20 TABLET | Refills: 0 | Status: SHIPPED | OUTPATIENT
Start: 2021-08-10 | End: 2021-08-15

## 2021-08-10 RX ORDER — CEPHALEXIN 500 MG/1
500 CAPSULE ORAL
Status: COMPLETED | OUTPATIENT
Start: 2021-08-10 | End: 2021-08-10

## 2021-08-10 RX ORDER — OXYCODONE AND ACETAMINOPHEN 5; 325 MG/1; MG/1
1 TABLET ORAL
Status: COMPLETED | OUTPATIENT
Start: 2021-08-10 | End: 2021-08-10

## 2021-08-10 RX ADMIN — CEPHALEXIN 500 MG: 500 CAPSULE ORAL at 12:08

## 2021-08-10 RX ADMIN — MUPIROCIN: 20 OINTMENT TOPICAL at 02:08

## 2021-08-10 RX ADMIN — OXYCODONE AND ACETAMINOPHEN 1 TABLET: 5; 325 TABLET ORAL at 12:08

## 2021-11-16 ENCOUNTER — LAB VISIT (OUTPATIENT)
Dept: LAB | Facility: OTHER | Age: 31
End: 2021-11-16
Attending: OBSTETRICS & GYNECOLOGY
Payer: MEDICAID

## 2021-11-16 ENCOUNTER — OFFICE VISIT (OUTPATIENT)
Dept: OBSTETRICS AND GYNECOLOGY | Facility: CLINIC | Age: 31
End: 2021-11-16
Attending: OBSTETRICS & GYNECOLOGY
Payer: MEDICAID

## 2021-11-16 VITALS
HEIGHT: 65 IN | BODY MASS INDEX: 45.77 KG/M2 | SYSTOLIC BLOOD PRESSURE: 120 MMHG | DIASTOLIC BLOOD PRESSURE: 80 MMHG | WEIGHT: 274.69 LBS

## 2021-11-16 DIAGNOSIS — Z11.3 SCREEN FOR STD (SEXUALLY TRANSMITTED DISEASE): ICD-10-CM

## 2021-11-16 DIAGNOSIS — Z30.432 ENCOUNTER FOR IUD REMOVAL: Primary | ICD-10-CM

## 2021-11-16 DIAGNOSIS — R23.2 HOT FLASHES: ICD-10-CM

## 2021-11-16 LAB
BASOPHILS # BLD AUTO: 0.02 K/UL (ref 0–0.2)
BASOPHILS NFR BLD: 0.2 % (ref 0–1.9)
DIFFERENTIAL METHOD: NORMAL
EOSINOPHIL # BLD AUTO: 0.2 K/UL (ref 0–0.5)
EOSINOPHIL NFR BLD: 1.5 % (ref 0–8)
ERYTHROCYTE [DISTWIDTH] IN BLOOD BY AUTOMATED COUNT: 13 % (ref 11.5–14.5)
ESTRADIOL SERPL-MCNC: 44 PG/ML
FSH SERPL-ACNC: 5.11 MIU/ML
HCT VFR BLD AUTO: 40.8 % (ref 37–48.5)
HGB BLD-MCNC: 13.4 G/DL (ref 12–16)
HIV 1+2 AB+HIV1 P24 AG SERPL QL IA: NEGATIVE
IMM GRANULOCYTES # BLD AUTO: 0.03 K/UL (ref 0–0.04)
IMM GRANULOCYTES NFR BLD AUTO: 0.3 % (ref 0–0.5)
LYMPHOCYTES # BLD AUTO: 2.2 K/UL (ref 1–4.8)
LYMPHOCYTES NFR BLD: 21 % (ref 18–48)
MCH RBC QN AUTO: 30 PG (ref 27–31)
MCHC RBC AUTO-ENTMCNC: 32.8 G/DL (ref 32–36)
MCV RBC AUTO: 91 FL (ref 82–98)
MONOCYTES # BLD AUTO: 0.8 K/UL (ref 0.3–1)
MONOCYTES NFR BLD: 7.8 % (ref 4–15)
NEUTROPHILS # BLD AUTO: 7.3 K/UL (ref 1.8–7.7)
NEUTROPHILS NFR BLD: 69.2 % (ref 38–73)
NRBC BLD-RTO: 0 /100 WBC
PLATELET # BLD AUTO: 276 K/UL (ref 150–450)
PMV BLD AUTO: 10.8 FL (ref 9.2–12.9)
RBC # BLD AUTO: 4.47 M/UL (ref 4–5.4)
T4 FREE SERPL-MCNC: 0.88 NG/DL (ref 0.71–1.51)
TSH SERPL DL<=0.005 MIU/L-ACNC: 0.37 UIU/ML (ref 0.4–4)
WBC # BLD AUTO: 10.58 K/UL (ref 3.9–12.7)

## 2021-11-16 PROCEDURE — 36415 COLL VENOUS BLD VENIPUNCTURE: CPT | Performed by: OBSTETRICS & GYNECOLOGY

## 2021-11-16 PROCEDURE — 87591 N.GONORRHOEAE DNA AMP PROB: CPT | Mod: 59 | Performed by: OBSTETRICS & GYNECOLOGY

## 2021-11-16 PROCEDURE — 86592 SYPHILIS TEST NON-TREP QUAL: CPT | Performed by: OBSTETRICS & GYNECOLOGY

## 2021-11-16 PROCEDURE — 99499 UNLISTED E&M SERVICE: CPT | Mod: S$PBB,,, | Performed by: OBSTETRICS & GYNECOLOGY

## 2021-11-16 PROCEDURE — 99999 PR PBB SHADOW E&M-EST. PATIENT-LVL III: ICD-10-PCS | Mod: PBBFAC,,, | Performed by: OBSTETRICS & GYNECOLOGY

## 2021-11-16 PROCEDURE — 58301 REMOVE INTRAUTERINE DEVICE: CPT | Mod: S$PBB,,, | Performed by: OBSTETRICS & GYNECOLOGY

## 2021-11-16 PROCEDURE — 99499 NO LOS: ICD-10-PCS | Mod: S$PBB,,, | Performed by: OBSTETRICS & GYNECOLOGY

## 2021-11-16 PROCEDURE — 87481 CANDIDA DNA AMP PROBE: CPT | Mod: 59 | Performed by: OBSTETRICS & GYNECOLOGY

## 2021-11-16 PROCEDURE — 87491 CHLMYD TRACH DNA AMP PROBE: CPT | Mod: 59 | Performed by: OBSTETRICS & GYNECOLOGY

## 2021-11-16 PROCEDURE — 82670 ASSAY OF TOTAL ESTRADIOL: CPT | Performed by: OBSTETRICS & GYNECOLOGY

## 2021-11-16 PROCEDURE — 99999 PR PBB SHADOW E&M-EST. PATIENT-LVL III: CPT | Mod: PBBFAC,,, | Performed by: OBSTETRICS & GYNECOLOGY

## 2021-11-16 PROCEDURE — 58301 REMOVE INTRAUTERINE DEVICE: CPT | Mod: PBBFAC | Performed by: OBSTETRICS & GYNECOLOGY

## 2021-11-16 PROCEDURE — 99213 OFFICE O/P EST LOW 20 MIN: CPT | Mod: PBBFAC,25 | Performed by: OBSTETRICS & GYNECOLOGY

## 2021-11-16 PROCEDURE — 83001 ASSAY OF GONADOTROPIN (FSH): CPT | Performed by: OBSTETRICS & GYNECOLOGY

## 2021-11-16 PROCEDURE — 84439 ASSAY OF FREE THYROXINE: CPT | Performed by: OBSTETRICS & GYNECOLOGY

## 2021-11-16 PROCEDURE — 84443 ASSAY THYROID STIM HORMONE: CPT | Performed by: OBSTETRICS & GYNECOLOGY

## 2021-11-16 PROCEDURE — 58301 PR REMOVE, INTRAUTERINE DEVICE: ICD-10-PCS | Mod: S$PBB,,, | Performed by: OBSTETRICS & GYNECOLOGY

## 2021-11-16 PROCEDURE — 87389 HIV-1 AG W/HIV-1&-2 AB AG IA: CPT | Performed by: OBSTETRICS & GYNECOLOGY

## 2021-11-16 PROCEDURE — 85025 COMPLETE CBC W/AUTO DIFF WBC: CPT | Performed by: OBSTETRICS & GYNECOLOGY

## 2021-11-17 ENCOUNTER — TELEPHONE (OUTPATIENT)
Dept: OBSTETRICS AND GYNECOLOGY | Facility: CLINIC | Age: 31
End: 2021-11-17
Payer: MEDICAID

## 2021-11-17 LAB — RPR SER QL: NORMAL

## 2021-11-19 ENCOUNTER — PATIENT MESSAGE (OUTPATIENT)
Dept: OBSTETRICS AND GYNECOLOGY | Facility: CLINIC | Age: 31
End: 2021-11-19
Payer: MEDICAID

## 2021-11-19 LAB
C TRACH DNA SPEC QL NAA+PROBE: NOT DETECTED
N GONORRHOEA DNA SPEC QL NAA+PROBE: NOT DETECTED

## 2021-11-21 ENCOUNTER — PATIENT MESSAGE (OUTPATIENT)
Dept: OBSTETRICS AND GYNECOLOGY | Facility: CLINIC | Age: 31
End: 2021-11-21
Payer: MEDICAID

## 2021-11-21 LAB
BACTERIAL VAGINOSIS DNA: POSITIVE
CANDIDA GLABRATA DNA: NEGATIVE
CANDIDA KRUSEI DNA: NEGATIVE
CANDIDA RRNA VAG QL PROBE: NEGATIVE
T VAGINALIS RRNA GENITAL QL PROBE: NEGATIVE

## 2021-11-21 RX ORDER — METRONIDAZOLE 500 MG/1
500 TABLET ORAL 2 TIMES DAILY
Qty: 14 TABLET | Refills: 0 | Status: SHIPPED | OUTPATIENT
Start: 2021-11-21 | End: 2021-11-28

## 2022-05-30 ENCOUNTER — HOSPITAL ENCOUNTER (EMERGENCY)
Facility: HOSPITAL | Age: 32
Discharge: HOME | End: 2022-05-30
Attending: EMERGENCY MEDICINE
Payer: MEDICAID

## 2022-05-30 VITALS
HEART RATE: 69 BPM | RESPIRATION RATE: 18 BRPM | DIASTOLIC BLOOD PRESSURE: 83 MMHG | WEIGHT: 280 LBS | TEMPERATURE: 99 F | BODY MASS INDEX: 46.65 KG/M2 | SYSTOLIC BLOOD PRESSURE: 123 MMHG | HEIGHT: 65 IN | OXYGEN SATURATION: 99 %

## 2022-05-30 DIAGNOSIS — M54.2 NECK PAIN: ICD-10-CM

## 2022-05-30 DIAGNOSIS — S16.1XXA CERVICAL STRAIN, ACUTE, INITIAL ENCOUNTER: Primary | ICD-10-CM

## 2022-05-30 DIAGNOSIS — M62.838 TRAPEZIUS MUSCLE SPASM: ICD-10-CM

## 2022-05-30 LAB
B-HCG UR QL: NEGATIVE
CTP QC/QA: YES

## 2022-05-30 PROCEDURE — 81025 URINE PREGNANCY TEST: CPT | Performed by: EMERGENCY MEDICINE

## 2022-05-30 PROCEDURE — 25000003 PHARM REV CODE 250: Performed by: EMERGENCY MEDICINE

## 2022-05-30 PROCEDURE — 63600175 PHARM REV CODE 636 W HCPCS: Performed by: EMERGENCY MEDICINE

## 2022-05-30 PROCEDURE — 99284 PR EMERGENCY DEPT VISIT,LEVEL IV: ICD-10-PCS | Mod: ,,, | Performed by: EMERGENCY MEDICINE

## 2022-05-30 PROCEDURE — 93005 ELECTROCARDIOGRAM TRACING: CPT

## 2022-05-30 PROCEDURE — 99284 EMERGENCY DEPT VISIT MOD MDM: CPT

## 2022-05-30 PROCEDURE — 93010 ELECTROCARDIOGRAM REPORT: CPT | Mod: ,,, | Performed by: INTERNAL MEDICINE

## 2022-05-30 PROCEDURE — 93010 EKG 12-LEAD: ICD-10-PCS | Mod: ,,, | Performed by: INTERNAL MEDICINE

## 2022-05-30 PROCEDURE — 99284 EMERGENCY DEPT VISIT MOD MDM: CPT | Mod: ,,, | Performed by: EMERGENCY MEDICINE

## 2022-05-30 PROCEDURE — 87389 HIV-1 AG W/HIV-1&-2 AB AG IA: CPT | Performed by: EMERGENCY MEDICINE

## 2022-05-30 PROCEDURE — 96372 THER/PROPH/DIAG INJ SC/IM: CPT | Performed by: EMERGENCY MEDICINE

## 2022-05-30 PROCEDURE — 86803 HEPATITIS C AB TEST: CPT | Performed by: EMERGENCY MEDICINE

## 2022-05-30 RX ORDER — METHYLPREDNISOLONE 4 MG/1
TABLET ORAL
Qty: 1 EACH | Refills: 0 | Status: SHIPPED | OUTPATIENT
Start: 2022-05-30 | End: 2022-06-20

## 2022-05-30 RX ORDER — KETOROLAC TROMETHAMINE 30 MG/ML
15 INJECTION, SOLUTION INTRAMUSCULAR; INTRAVENOUS
Status: COMPLETED | OUTPATIENT
Start: 2022-05-30 | End: 2022-05-30

## 2022-05-30 RX ORDER — METHOCARBAMOL 500 MG/1
1000 TABLET, FILM COATED ORAL 3 TIMES DAILY
Qty: 31 TABLET | Refills: 0 | Status: SHIPPED | OUTPATIENT
Start: 2022-05-30 | End: 2022-06-04

## 2022-05-30 RX ORDER — LIDOCAINE 50 MG/G
2 PATCH TOPICAL
Status: DISCONTINUED | OUTPATIENT
Start: 2022-05-30 | End: 2022-05-30 | Stop reason: HOSPADM

## 2022-05-30 RX ORDER — LIDOCAINE 50 MG/G
1 PATCH TOPICAL DAILY
Qty: 7 PATCH | Refills: 0 | Status: SHIPPED | OUTPATIENT
Start: 2022-05-30 | End: 2022-06-06

## 2022-05-30 RX ADMIN — LIDOCAINE 2 PATCH: 50 PATCH CUTANEOUS at 12:05

## 2022-05-30 RX ADMIN — KETOROLAC TROMETHAMINE 15 MG: 30 INJECTION, SOLUTION INTRAMUSCULAR at 12:05

## 2022-05-30 NOTE — ED NOTES
Patient identifiers verified and correct for MS Moeller  C/C: Neck and shoulder pain SEE NN  APPEARANCE: awake and alert in NAD.  SKIN: warm, dry and intact. No breakdown or bruising.  MUSCULOSKELETAL: Patient moving all extremities spontaneously, no obvious swelling or deformities noted. Ambulates independently.  RESPIRATORY: Denies shortness of breath.Respirations unlabored.   CARDIAC: Denies CP, 2+ distal pulses; no peripheral edema  ABDOMEN: S/ND/NT, Denies nausea  : voids spontaneously, denies difficulty  Neurologic: AAO x 4; follows commands equal strength in all extremities; denies numbness/tingling. Denies dizziness Denies weakness,
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
## 2022-05-31 LAB
HCV AB SERPL QL IA: NEGATIVE
HIV 1+2 AB+HIV1 P24 AG SERPL QL IA: NEGATIVE

## 2022-08-30 ENCOUNTER — NURSE TRIAGE (OUTPATIENT)
Dept: ADMINISTRATIVE | Facility: CLINIC | Age: 32
End: 2022-08-30
Payer: MEDICAID

## 2022-08-31 ENCOUNTER — HOSPITAL ENCOUNTER (EMERGENCY)
Facility: HOSPITAL | Age: 32
Discharge: HOME | End: 2022-08-31
Attending: EMERGENCY MEDICINE
Payer: MEDICAID

## 2022-08-31 VITALS
TEMPERATURE: 98 F | RESPIRATION RATE: 18 BRPM | WEIGHT: 287 LBS | BODY MASS INDEX: 43.5 KG/M2 | DIASTOLIC BLOOD PRESSURE: 73 MMHG | OXYGEN SATURATION: 99 % | HEIGHT: 68 IN | HEART RATE: 79 BPM | SYSTOLIC BLOOD PRESSURE: 122 MMHG

## 2022-08-31 DIAGNOSIS — R11.2 NON-INTRACTABLE VOMITING WITH NAUSEA, UNSPECIFIED VOMITING TYPE: Primary | ICD-10-CM

## 2022-08-31 LAB
ALBUMIN SERPL BCP-MCNC: 3.7 G/DL (ref 3.5–5.2)
ALP SERPL-CCNC: 63 U/L (ref 55–135)
ALT SERPL W/O P-5'-P-CCNC: 12 U/L (ref 10–44)
ANION GAP SERPL CALC-SCNC: 11 MMOL/L (ref 8–16)
AST SERPL-CCNC: 22 U/L (ref 10–40)
B-HCG UR QL: NEGATIVE
BACTERIA #/AREA URNS AUTO: NORMAL /HPF
BASOPHILS # BLD AUTO: 0.04 K/UL (ref 0–0.2)
BASOPHILS NFR BLD: 0.5 % (ref 0–1.9)
BILIRUB SERPL-MCNC: 0.8 MG/DL (ref 0.1–1)
BILIRUB UR QL STRIP: NEGATIVE
BUN SERPL-MCNC: 9 MG/DL (ref 6–20)
CALCIUM SERPL-MCNC: 8.9 MG/DL (ref 8.7–10.5)
CHLORIDE SERPL-SCNC: 108 MMOL/L (ref 95–110)
CLARITY UR REFRACT.AUTO: CLEAR
CO2 SERPL-SCNC: 20 MMOL/L (ref 23–29)
COLOR UR AUTO: YELLOW
CREAT SERPL-MCNC: 0.8 MG/DL (ref 0.5–1.4)
CTP QC/QA: YES
DIFFERENTIAL METHOD: NORMAL
EOSINOPHIL # BLD AUTO: 0.2 K/UL (ref 0–0.5)
EOSINOPHIL NFR BLD: 2.6 % (ref 0–8)
ERYTHROCYTE [DISTWIDTH] IN BLOOD BY AUTOMATED COUNT: 12.5 % (ref 11.5–14.5)
EST. GFR  (NO RACE VARIABLE): >60 ML/MIN/1.73 M^2
GLUCOSE SERPL-MCNC: 103 MG/DL (ref 70–110)
GLUCOSE UR QL STRIP: NEGATIVE
HCT VFR BLD AUTO: 37.4 % (ref 37–48.5)
HGB BLD-MCNC: 12.3 G/DL (ref 12–16)
HGB UR QL STRIP: ABNORMAL
HYALINE CASTS UR QL AUTO: 0 /LPF
IMM GRANULOCYTES # BLD AUTO: 0.02 K/UL (ref 0–0.04)
IMM GRANULOCYTES NFR BLD AUTO: 0.3 % (ref 0–0.5)
KETONES UR QL STRIP: NEGATIVE
LEUKOCYTE ESTERASE UR QL STRIP: NEGATIVE
LIPASE SERPL-CCNC: 15 U/L (ref 4–60)
LYMPHOCYTES # BLD AUTO: 1.9 K/UL (ref 1–4.8)
LYMPHOCYTES NFR BLD: 23.2 % (ref 18–48)
MAGNESIUM SERPL-MCNC: 1.9 MG/DL (ref 1.6–2.6)
MCH RBC QN AUTO: 29.1 PG (ref 27–31)
MCHC RBC AUTO-ENTMCNC: 32.9 G/DL (ref 32–36)
MCV RBC AUTO: 88 FL (ref 82–98)
MICROSCOPIC COMMENT: NORMAL
MONOCYTES # BLD AUTO: 0.6 K/UL (ref 0.3–1)
MONOCYTES NFR BLD: 7.7 % (ref 4–15)
NEUTROPHILS # BLD AUTO: 5.2 K/UL (ref 1.8–7.7)
NEUTROPHILS NFR BLD: 65.7 % (ref 38–73)
NITRITE UR QL STRIP: NEGATIVE
NRBC BLD-RTO: 0 /100 WBC
PH UR STRIP: 7 [PH] (ref 5–8)
PLATELET # BLD AUTO: 268 K/UL (ref 150–450)
PMV BLD AUTO: 10.6 FL (ref 9.2–12.9)
POTASSIUM SERPL-SCNC: 4.3 MMOL/L (ref 3.5–5.1)
PROT SERPL-MCNC: 7 G/DL (ref 6–8.4)
PROT UR QL STRIP: ABNORMAL
RBC # BLD AUTO: 4.23 M/UL (ref 4–5.4)
RBC #/AREA URNS AUTO: 2 /HPF (ref 0–4)
SODIUM SERPL-SCNC: 139 MMOL/L (ref 136–145)
SP GR UR STRIP: >1.03 (ref 1–1.03)
SQUAMOUS #/AREA URNS AUTO: 9 /HPF
URN SPEC COLLECT METH UR: ABNORMAL
WBC # BLD AUTO: 7.96 K/UL (ref 3.9–12.7)
WBC #/AREA URNS AUTO: 2 /HPF (ref 0–5)

## 2022-08-31 PROCEDURE — 96361 HYDRATE IV INFUSION ADD-ON: CPT

## 2022-08-31 PROCEDURE — 96374 THER/PROPH/DIAG INJ IV PUSH: CPT

## 2022-08-31 PROCEDURE — 80053 COMPREHEN METABOLIC PANEL: CPT | Performed by: EMERGENCY MEDICINE

## 2022-08-31 PROCEDURE — 81001 URINALYSIS AUTO W/SCOPE: CPT | Performed by: EMERGENCY MEDICINE

## 2022-08-31 PROCEDURE — 83735 ASSAY OF MAGNESIUM: CPT | Performed by: EMERGENCY MEDICINE

## 2022-08-31 PROCEDURE — 85025 COMPLETE CBC W/AUTO DIFF WBC: CPT | Performed by: EMERGENCY MEDICINE

## 2022-08-31 PROCEDURE — 99284 EMERGENCY DEPT VISIT MOD MDM: CPT | Mod: 25

## 2022-08-31 PROCEDURE — 99284 PR EMERGENCY DEPT VISIT,LEVEL IV: ICD-10-PCS | Mod: ,,, | Performed by: EMERGENCY MEDICINE

## 2022-08-31 PROCEDURE — 81025 URINE PREGNANCY TEST: CPT | Performed by: EMERGENCY MEDICINE

## 2022-08-31 PROCEDURE — 63600175 PHARM REV CODE 636 W HCPCS: Performed by: EMERGENCY MEDICINE

## 2022-08-31 PROCEDURE — 99284 EMERGENCY DEPT VISIT MOD MDM: CPT | Mod: ,,, | Performed by: EMERGENCY MEDICINE

## 2022-08-31 PROCEDURE — 83690 ASSAY OF LIPASE: CPT | Performed by: EMERGENCY MEDICINE

## 2022-08-31 PROCEDURE — 25000003 PHARM REV CODE 250: Performed by: EMERGENCY MEDICINE

## 2022-08-31 RX ORDER — ONDANSETRON 4 MG/1
8 TABLET, ORALLY DISINTEGRATING ORAL EVERY 8 HOURS PRN
Qty: 12 TABLET | Refills: 0 | OUTPATIENT
Start: 2022-08-31 | End: 2022-12-27

## 2022-08-31 RX ORDER — PANTOPRAZOLE SODIUM 20 MG/1
20 TABLET, DELAYED RELEASE ORAL DAILY
Qty: 30 TABLET | Refills: 0 | OUTPATIENT
Start: 2022-08-31 | End: 2022-12-27

## 2022-08-31 RX ORDER — ONDANSETRON 2 MG/ML
4 INJECTION INTRAMUSCULAR; INTRAVENOUS
Status: COMPLETED | OUTPATIENT
Start: 2022-08-31 | End: 2022-08-31

## 2022-08-31 RX ADMIN — ONDANSETRON 4 MG: 2 INJECTION INTRAMUSCULAR; INTRAVENOUS at 10:08

## 2022-08-31 RX ADMIN — SODIUM CHLORIDE 1000 ML: 0.9 INJECTION, SOLUTION INTRAVENOUS at 10:08

## 2022-08-31 NOTE — ED PROVIDER NOTES
Encounter Date: 8/31/2022       History     Chief Complaint   Patient presents with    Vomiting     Started 2  weeks ago, back, headache     31-year-old female with past medical history including anemia and asthma presents for evaluation of intermittent vomiting.  She reports that she has been vomiting once or twice a day for the last 2 weeks.  Not associated with diarrhea.  She states that she is able to eat some foods like seafood, crackers and cold drinks.  She reports that sometimes when she is at work and will try to eat a burger, nuggets or fries, she will throw up.  She denies any abdominal pain.  She reports that she gets a funny feeling just prior to throwing up.  After throwing up she feels better.    The history is provided by the patient.   Review of patient's allergies indicates:  No Known Allergies  Past Medical History:   Diagnosis Date    Anemia     Arthritis     Asthma     Carpal tunnel syndrome     Complication of anesthesia     one sided epidural     Past Surgical History:   Procedure Laterality Date    TUBAL LIGATION      2013     Family History   Problem Relation Age of Onset    Hypertension Maternal Grandmother     Hyperlipidemia Maternal Grandmother     Breast cancer Maternal Grandmother         dx in age - unknown    Hypertension Mother     Asthma Sister     Asthma Son     Heart disease Maternal Aunt     Cancer Maternal Uncle     Colon cancer Maternal Uncle     Ovarian cancer Neg Hx      Social History     Tobacco Use    Smoking status: Never    Smokeless tobacco: Never   Substance Use Topics    Alcohol use: Yes     Alcohol/week: 2.0 standard drinks     Types: 2 Shots of liquor per week     Comment: social    Drug use: No     Review of Systems   Constitutional:  Negative for fever.   HENT:  Negative for sore throat.    Respiratory:  Negative for shortness of breath.    Cardiovascular:  Negative for chest pain.   Gastrointestinal:  Positive for nausea and vomiting.   Genitourinary:  Negative  for dysuria.   Musculoskeletal:  Negative for back pain.   Skin:  Negative for rash.   Neurological:  Negative for weakness.   Hematological:  Does not bruise/bleed easily.   All other systems reviewed and are negative.    Physical Exam     Initial Vitals [08/31/22 0921]   BP Pulse Resp Temp SpO2   119/76 79 16 98.1 °F (36.7 °C) 99 %      MAP       --         Physical Exam    Nursing note and vitals reviewed.  Constitutional: She appears well-developed. She is Obese .   HENT:   Head: Normocephalic and atraumatic.   Mouth/Throat: Oropharynx is clear and moist.   Eyes: Conjunctivae and EOM are normal. Pupils are equal, round, and reactive to light.   Neck: Phonation normal. Neck supple.   Normal range of motion.  Cardiovascular:  Normal rate, regular rhythm, normal heart sounds and normal pulses.           Pulmonary/Chest: Breath sounds normal. No respiratory distress.   Abdominal: Abdomen is soft. There is no abdominal tenderness.   Musculoskeletal:         General: Normal range of motion.      Cervical back: Normal range of motion and neck supple.     Neurological: She is alert and oriented to person, place, and time.   Skin: Skin is warm and dry.       ED Course   Procedures  Labs Reviewed   URINALYSIS, REFLEX TO URINE CULTURE - Abnormal; Notable for the following components:       Result Value    Specific Gravity, UA >1.030 (*)     Protein, UA 1+ (*)     Occult Blood UA Trace (*)     All other components within normal limits    Narrative:     Specimen Source->Urine   COMPREHENSIVE METABOLIC PANEL - Abnormal; Notable for the following components:    CO2 20 (*)     All other components within normal limits   URINALYSIS MICROSCOPIC    Narrative:     Specimen Source->Urine   CBC W/ AUTO DIFFERENTIAL   MAGNESIUM   LIPASE   POCT URINE PREGNANCY          Imaging Results    None          Medications   sodium chloride 0.9% bolus 1,000 mL (0 mLs Intravenous Stopped 8/31/22 1149)   ondansetron injection 4 mg (4 mg  Intravenous Given 8/31/22 1043)     Medical Decision Making:   History:   Old Medical Records: I decided to obtain old medical records.  Initial Assessment:   Evaluation of intermittent vomiting  Differential Diagnosis:   Cholelithiasis less likely with no right upper quadrant pain, food-borne illness, viral illness, reflux  Clinical Tests:   Lab Tests: Ordered and Reviewed          Attending Attestation:             Attending ED Notes:   Lab work is unremarkable.  No evidence of dehydration, electrolyte abnormality or pancreatitis.  Given lack of significant abdominal tenderness, no indication for ultrasound imaging.  Discharge with Zofran and Protonix to start.  Recommend follow-up with primary care re-evaluation of symptoms.    ED Course as of 08/31/22 1629   Wed Aug 31, 2022   1020 Initial plan for labs, IV fluids and antiemetic.  No signs of clinical dehydration.  Abdominal exam is benign at this time.  No indication for imaging.  Will readdress need for imaging if lab work is abnormal. [HS]   1109 Labs unremarkable. No acute process [HS]      ED Course User Index  [HS] Alesia Dumont MD             Clinical Impression:   Final diagnoses:  [R11.2] Non-intractable vomiting with nausea, unspecified vomiting type (Primary)      ED Disposition Condition    Discharge Stable          ED Prescriptions       Medication Sig Dispense Start Date End Date Auth. Provider    ondansetron (ZOFRAN-ODT) 4 MG TbDL Take 2 tablets (8 mg total) by mouth every 8 (eight) hours as needed (nausea). 12 tablet 8/31/2022 -- Alesia Dumont MD    pantoprazole (PROTONIX) 20 MG tablet Take 1 tablet (20 mg total) by mouth once daily. 30 tablet 8/31/2022 8/31/2023 Alesia Dumont MD          Follow-up Information       Follow up With Specialties Details Why Contact Info    Nuvia Gaston NP Internal Medicine Schedule an appointment as soon as possible for a visit   [address]  [facility]  [id number]  [phone]               Alesia Dumont MD  08/31/22 9559

## 2022-08-31 NOTE — ED NOTES
"Pt with vomiting, back pain and abd pain x 1 week with headaches.  Pt states that she has taken Tylenol and advil without relief.  Pt denies dysuria.  Pt states that she has taken UPT which was negative.  Pt was seen at  with unknown diagnosis.  Pt reports her abd "feels tight".    LOC: The patient is awake, alert and aware of environment with an appropriate affect, the patient is oriented x 3 and speaking appropriately.  APPEARANCE: Patient resting comfortably and in no acute distress, patient is clean and well groomed, patient's clothing is properly fastened.  SKIN: The skin is warm and dry, color consistent with ethnicity, patient has normal skin turgor and moist mucus membranes, skin intact, no breakdown or bruising noted.  MUSCULOSKELETAL: Patient moving all extremities spontaneously, no obvious swelling or deformities noted.  RESPIRATORY: Airway is open and patent, respirations are spontaneous, patient has a normal effort and rate, no accessory muscle use noted.  ABDOMEN: Soft and + tender to palpation, no distention noted.   "

## 2022-08-31 NOTE — TELEPHONE ENCOUNTER
Attempted to contact pt x2, unable to leave a VM message.     Reason for Disposition   Second attempt to contact caller AND no contact made. Phone number verified.    Protocols used: No Contact or Duplicate Contact Call-A-AH

## 2022-08-31 NOTE — DISCHARGE INSTRUCTIONS
Start Protonix daily.  Take Zofran as needed for nausea and vomiting.    Schedule an appointment to follow-up with your primary care provider for re-evaluation if symptoms persist.    Return to the emergency department with severe abdominal pain or not tolerating anything by mouth

## 2022-10-06 ENCOUNTER — LAB VISIT (OUTPATIENT)
Dept: LAB | Facility: OTHER | Age: 32
End: 2022-10-06
Attending: OBSTETRICS & GYNECOLOGY
Payer: MEDICAID

## 2022-10-06 ENCOUNTER — OFFICE VISIT (OUTPATIENT)
Dept: OBSTETRICS AND GYNECOLOGY | Facility: CLINIC | Age: 32
End: 2022-10-06
Attending: OBSTETRICS & GYNECOLOGY
Payer: MEDICAID

## 2022-10-06 VITALS
WEIGHT: 280.63 LBS | HEIGHT: 68 IN | DIASTOLIC BLOOD PRESSURE: 76 MMHG | BODY MASS INDEX: 42.53 KG/M2 | SYSTOLIC BLOOD PRESSURE: 118 MMHG

## 2022-10-06 DIAGNOSIS — Z11.3 SCREEN FOR STD (SEXUALLY TRANSMITTED DISEASE): ICD-10-CM

## 2022-10-06 DIAGNOSIS — R10.2 PELVIC PAIN: ICD-10-CM

## 2022-10-06 DIAGNOSIS — Z01.419 WOMEN'S ANNUAL ROUTINE GYNECOLOGICAL EXAMINATION: Primary | ICD-10-CM

## 2022-10-06 DIAGNOSIS — N93.9 ABNORMAL UTERINE BLEEDING (AUB): ICD-10-CM

## 2022-10-06 LAB
HAV IGM SERPL QL IA: NORMAL
HBV CORE IGM SERPL QL IA: NORMAL
HBV SURFACE AG SERPL QL IA: NORMAL
HCV AB SERPL QL IA: NORMAL
HIV 1+2 AB+HIV1 P24 AG SERPL QL IA: NORMAL

## 2022-10-06 PROCEDURE — 36415 COLL VENOUS BLD VENIPUNCTURE: CPT | Performed by: OBSTETRICS & GYNECOLOGY

## 2022-10-06 PROCEDURE — 3078F DIAST BP <80 MM HG: CPT | Mod: CPTII,,, | Performed by: OBSTETRICS & GYNECOLOGY

## 2022-10-06 PROCEDURE — 1160F RVW MEDS BY RX/DR IN RCRD: CPT | Mod: CPTII,,, | Performed by: OBSTETRICS & GYNECOLOGY

## 2022-10-06 PROCEDURE — 86592 SYPHILIS TEST NON-TREP QUAL: CPT | Performed by: OBSTETRICS & GYNECOLOGY

## 2022-10-06 PROCEDURE — 3008F PR BODY MASS INDEX (BMI) DOCUMENTED: ICD-10-PCS | Mod: CPTII,,, | Performed by: OBSTETRICS & GYNECOLOGY

## 2022-10-06 PROCEDURE — 1159F MED LIST DOCD IN RCRD: CPT | Mod: CPTII,,, | Performed by: OBSTETRICS & GYNECOLOGY

## 2022-10-06 PROCEDURE — 99999 PR PBB SHADOW E&M-EST. PATIENT-LVL III: ICD-10-PCS | Mod: PBBFAC,,, | Performed by: OBSTETRICS & GYNECOLOGY

## 2022-10-06 PROCEDURE — 99395 PR PREVENTIVE VISIT,EST,18-39: ICD-10-PCS | Mod: S$PBB,,, | Performed by: OBSTETRICS & GYNECOLOGY

## 2022-10-06 PROCEDURE — 99395 PREV VISIT EST AGE 18-39: CPT | Mod: S$PBB,,, | Performed by: OBSTETRICS & GYNECOLOGY

## 2022-10-06 PROCEDURE — 87389 HIV-1 AG W/HIV-1&-2 AB AG IA: CPT | Performed by: OBSTETRICS & GYNECOLOGY

## 2022-10-06 PROCEDURE — 3078F PR MOST RECENT DIASTOLIC BLOOD PRESSURE < 80 MM HG: ICD-10-PCS | Mod: CPTII,,, | Performed by: OBSTETRICS & GYNECOLOGY

## 2022-10-06 PROCEDURE — 1159F PR MEDICATION LIST DOCUMENTED IN MEDICAL RECORD: ICD-10-PCS | Mod: CPTII,,, | Performed by: OBSTETRICS & GYNECOLOGY

## 2022-10-06 PROCEDURE — 87491 CHLMYD TRACH DNA AMP PROBE: CPT | Performed by: OBSTETRICS & GYNECOLOGY

## 2022-10-06 PROCEDURE — 99999 PR PBB SHADOW E&M-EST. PATIENT-LVL III: CPT | Mod: PBBFAC,,, | Performed by: OBSTETRICS & GYNECOLOGY

## 2022-10-06 PROCEDURE — 3074F SYST BP LT 130 MM HG: CPT | Mod: CPTII,,, | Performed by: OBSTETRICS & GYNECOLOGY

## 2022-10-06 PROCEDURE — 3074F PR MOST RECENT SYSTOLIC BLOOD PRESSURE < 130 MM HG: ICD-10-PCS | Mod: CPTII,,, | Performed by: OBSTETRICS & GYNECOLOGY

## 2022-10-06 PROCEDURE — 3008F BODY MASS INDEX DOCD: CPT | Mod: CPTII,,, | Performed by: OBSTETRICS & GYNECOLOGY

## 2022-10-06 PROCEDURE — 80074 ACUTE HEPATITIS PANEL: CPT | Performed by: OBSTETRICS & GYNECOLOGY

## 2022-10-06 PROCEDURE — 99213 OFFICE O/P EST LOW 20 MIN: CPT | Mod: PBBFAC | Performed by: OBSTETRICS & GYNECOLOGY

## 2022-10-06 PROCEDURE — 87591 N.GONORRHOEAE DNA AMP PROB: CPT | Performed by: OBSTETRICS & GYNECOLOGY

## 2022-10-06 PROCEDURE — 81514 NFCT DS BV&VAGINITIS DNA ALG: CPT | Performed by: OBSTETRICS & GYNECOLOGY

## 2022-10-06 PROCEDURE — 1160F PR REVIEW ALL MEDS BY PRESCRIBER/CLIN PHARMACIST DOCUMENTED: ICD-10-PCS | Mod: CPTII,,, | Performed by: OBSTETRICS & GYNECOLOGY

## 2022-10-06 RX ORDER — ALBUTEROL SULFATE 0.63 MG/3ML
SOLUTION RESPIRATORY (INHALATION)
COMMUNITY
End: 2022-12-27

## 2022-10-06 RX ORDER — MEDROXYPROGESTERONE ACETATE 10 MG/1
10 TABLET ORAL DAILY
Qty: 7 TABLET | Refills: 6 | OUTPATIENT
Start: 2022-10-06 | End: 2022-12-27

## 2022-10-06 NOTE — PROGRESS NOTES
Rachael Moeller is a 31 y.o. female  who presents for annual exam and evaluation of gyn issues.  In general, menses have been occurring monthly without intermenstrual bleeding.  However, she did not have a period 2022  and was seen in the ER. UPT at that time was negative.  She describes having a spontaneous period that occurred 2022 and lasted for 7 days with heavy flow and clots.  In the past, she has tried multiple medical treatments for management of heavy periods including Evra patches, NuvaRing, and IUD.  S/P BTL .  She also describes having intermittent episodes right lower quadrant discomfort for many years.  Prior workups for pelvic pain had not revealed any specific etiology.  Requests STD screening.  Patient's last menstrual period was 10/06/2022.    UPT today: Negative    21 Pap: Negative, HPV: Negative    Past Medical History:   Diagnosis Date    Anemia     Arthritis     Asthma     Carpal tunnel syndrome     Complication of anesthesia     one sided epidural       Past Surgical History:   Procedure Laterality Date    TUBAL LIGATION             OB History          6    Para   4    Term   4            AB   2    Living   4         SAB   1    IAB   1    Ectopic        Multiple        Live Births   4                 ROS:  GENERAL: Feeling well overall.   SKIN: Denies rash or lesions.   HEAD: Denies head injury or headache.   NODES: Denies enlarged lymph nodes.   CHEST: Denies chest pain or shortness of breath.   CARDIOVASCULAR: Denies palpitations or left sided chest pain.   ABDOMEN: Reports episodes of RLQ discomfort.  URINARY: No dysuria or hematuria.  REPRODUCTIVE: See HPI.   BREASTS: Denies pain, lumps, or nipple discharge.   HEMATOLOGIC: Reports heavy periods.   MUSCULOSKELETAL: Denies joint pain or swelling.   NEUROLOGIC: Denies syncope or weakness.   PSYCHIATRIC: Denies depression.    PE:   (chaperone present during entire exam)  APPEARANCE: Well nourished,  well developed, in no acute distress.  BREASTS: Symmetrical, no skin changes or visible lesions. No palpable masses, nipple discharge or adenopathy bilaterally.  ABDOMEN: Soft. Mld tenderness RLQ to deep palpation.  No guarding.  No rebound.  No CVA tenderness.  VULVA: No lesions. Normal female genitalia.  URETHRAL MEATUS: Normal size and location, no lesions, no prolapse.  URETHRA: No masses, tenderness, prolapse or scarring.  VAGINA: Moist and well rugated, minimal menstrual blood in vault.  CERVIX: No lesions and discharge. No CMT.  UTERUS: Normal size, regular shape, mobile, non-tender, bladder base nontender.  ADNEXA: Mild tenderness on left but no distinct mass noted.   ANUS PERINEUM: Normal.      Diagnosis:  1. Women's annual routine gynecological examination    2. Abnormal uterine bleeding (AUB)    3. Pelvic pain    4. Screen for STD (sexually transmitted disease)          PLAN:    Orders Placed This Encounter    C. trachomatis/N. gonorrhoeae by AMP DNA [facility]; Vagina    Vaginosis Screen by DNA Probe    US Pelvis Comp with Transvag NON-OB (xpd    HIV 1/2 Ag/Ab (4th Gen)    RPR    Hepatitis Panel, Acute       Patient was counseled today on the need for annual gyn exams.  We discussed her recent irregular menses and heavy period.  UPT today was normal.  She would like a trial of Provera for menstrual regulation.  We discussed Provera 10 mg x 7 days each month.  We also reviewed her long history of intermittent pelvic pain.  She will have a follow-up ultrasound performed.  We will contact her with results of the STD screening, labs, and ultrasound.    Follow-up in 1 year.

## 2022-10-07 ENCOUNTER — PATIENT MESSAGE (OUTPATIENT)
Dept: OBSTETRICS AND GYNECOLOGY | Facility: CLINIC | Age: 32
End: 2022-10-07
Payer: MEDICAID

## 2022-10-07 LAB — RPR SER QL: NORMAL

## 2022-10-08 ENCOUNTER — TELEPHONE (OUTPATIENT)
Dept: OBSTETRICS AND GYNECOLOGY | Facility: CLINIC | Age: 32
End: 2022-10-08
Payer: MEDICAID

## 2022-10-08 ENCOUNTER — PATIENT MESSAGE (OUTPATIENT)
Dept: OBSTETRICS AND GYNECOLOGY | Facility: CLINIC | Age: 32
End: 2022-10-08
Payer: MEDICAID

## 2022-10-08 LAB
BACTERIAL VAGINOSIS DNA: POSITIVE
C TRACH DNA SPEC QL NAA+PROBE: NOT DETECTED
CANDIDA GLABRATA DNA: NEGATIVE
CANDIDA KRUSEI DNA: NEGATIVE
CANDIDA RRNA VAG QL PROBE: NEGATIVE
N GONORRHOEA DNA SPEC QL NAA+PROBE: NOT DETECTED
T VAGINALIS RRNA GENITAL QL PROBE: NEGATIVE

## 2022-10-08 RX ORDER — METRONIDAZOLE 500 MG/1
500 TABLET ORAL EVERY 12 HOURS
Qty: 14 TABLET | Refills: 0 | OUTPATIENT
Start: 2022-10-08 | End: 2022-12-27

## 2022-10-09 ENCOUNTER — PATIENT MESSAGE (OUTPATIENT)
Dept: OBSTETRICS AND GYNECOLOGY | Facility: CLINIC | Age: 32
End: 2022-10-09
Payer: MEDICAID

## 2022-10-09 RX ORDER — METRONIDAZOLE 500 MG/1
500 TABLET ORAL 2 TIMES DAILY
Qty: 14 TABLET | Refills: 0 | Status: SHIPPED | OUTPATIENT
Start: 2022-10-09 | End: 2022-10-16

## 2022-11-26 NOTE — Clinical Note
"Rachael Ramonfarhat Moeller was seen and treated in our emergency department on 5/30/2022.  She may return to work on 06/02/2022.       If you have any questions or concerns, please don't hesitate to call.      Shane Xiong MD" Presentation concerning for underlying pneumonia, with leukocytosis, fever, positive Pro-Alex and bilateral lung base opacifications on CT scan.  S/p 1 dose of ceftriaxone and azithromycin in the ED  Blood cultures collected in the ED  Sputum cultures have been ordered and are pending    -We will continue ceftriaxone and 250 mg azithromycin for total of 5 days  -RT protocol

## 2022-12-27 ENCOUNTER — HOSPITAL ENCOUNTER (EMERGENCY)
Facility: HOSPITAL | Age: 32
Discharge: HOME | End: 2022-12-27
Attending: EMERGENCY MEDICINE
Payer: MEDICAID

## 2022-12-27 VITALS
BODY MASS INDEX: 46.45 KG/M2 | RESPIRATION RATE: 16 BRPM | DIASTOLIC BLOOD PRESSURE: 86 MMHG | OXYGEN SATURATION: 99 % | TEMPERATURE: 98 F | HEART RATE: 74 BPM | HEIGHT: 66 IN | SYSTOLIC BLOOD PRESSURE: 157 MMHG | WEIGHT: 289 LBS

## 2022-12-27 DIAGNOSIS — J30.9 ALLERGIC RHINITIS, UNSPECIFIED SEASONALITY, UNSPECIFIED TRIGGER: ICD-10-CM

## 2022-12-27 DIAGNOSIS — K59.00 CONSTIPATION, UNSPECIFIED CONSTIPATION TYPE: ICD-10-CM

## 2022-12-27 DIAGNOSIS — J06.9 VIRAL URI WITH COUGH: Primary | ICD-10-CM

## 2022-12-27 LAB
B-HCG UR QL: NEGATIVE
BILIRUBIN, POC UA: NEGATIVE
BLOOD, POC UA: NEGATIVE
CLARITY, POC UA: CLEAR
COLOR, POC UA: YELLOW
CTP QC/QA: YES
CTP QC/QA: YES
GLUCOSE, POC UA: NEGATIVE
INFLUENZA A ANTIGEN, POC: NEGATIVE
INFLUENZA B ANTIGEN, POC: NEGATIVE
KETONES, POC UA: NEGATIVE
LEUKOCYTE EST, POC UA: NEGATIVE
NITRITE, POC UA: NEGATIVE
PH UR STRIP: 7 [PH]
PROTEIN, POC UA: NEGATIVE
SARS-COV-2 RDRP RESP QL NAA+PROBE: NEGATIVE
SPECIFIC GRAVITY, POC UA: 1.02
UROBILINOGEN, POC UA: 0.2 E.U./DL

## 2022-12-27 PROCEDURE — 81025 URINE PREGNANCY TEST: CPT | Mod: ER | Performed by: NURSE PRACTITIONER

## 2022-12-27 PROCEDURE — 81003 URINALYSIS AUTO W/O SCOPE: CPT | Mod: ER

## 2022-12-27 PROCEDURE — 63600175 PHARM REV CODE 636 W HCPCS: Mod: ER | Performed by: NURSE PRACTITIONER

## 2022-12-27 PROCEDURE — 87635 SARS-COV-2 COVID-19 AMP PRB: CPT | Mod: ER | Performed by: NURSE PRACTITIONER

## 2022-12-27 PROCEDURE — 87804 INFLUENZA ASSAY W/OPTIC: CPT | Mod: ER

## 2022-12-27 PROCEDURE — 99284 EMERGENCY DEPT VISIT MOD MDM: CPT | Mod: 25,ER

## 2022-12-27 PROCEDURE — 96372 THER/PROPH/DIAG INJ SC/IM: CPT | Performed by: NURSE PRACTITIONER

## 2022-12-27 RX ORDER — PROMETHAZINE HYDROCHLORIDE AND DEXTROMETHORPHAN HYDROBROMIDE 6.25; 15 MG/5ML; MG/5ML
5 SYRUP ORAL NIGHTLY PRN
Qty: 180 ML | Refills: 0 | Status: SHIPPED | OUTPATIENT
Start: 2022-12-27 | End: 2023-01-06

## 2022-12-27 RX ORDER — KETOROLAC TROMETHAMINE 30 MG/ML
30 INJECTION, SOLUTION INTRAMUSCULAR; INTRAVENOUS
Status: COMPLETED | OUTPATIENT
Start: 2022-12-27 | End: 2022-12-27

## 2022-12-27 RX ORDER — BENZONATATE 100 MG/1
100 CAPSULE ORAL 3 TIMES DAILY PRN
Qty: 30 CAPSULE | Refills: 0 | Status: SHIPPED | OUTPATIENT
Start: 2022-12-27 | End: 2023-01-06

## 2022-12-27 RX ORDER — DEXTROMETHORPHAN HYDROBROMIDE, GUAIFENESIN 5; 100 MG/5ML; MG/5ML
650 LIQUID ORAL EVERY 8 HOURS
Qty: 20 TABLET | Refills: 0 | Status: SHIPPED | OUTPATIENT
Start: 2022-12-27 | End: 2023-01-01

## 2022-12-27 RX ORDER — FLUTICASONE PROPIONATE 50 MCG
1 SPRAY, SUSPENSION (ML) NASAL 2 TIMES DAILY PRN
Qty: 15 G | Refills: 0 | Status: SHIPPED | OUTPATIENT
Start: 2022-12-27 | End: 2023-01-10

## 2022-12-27 RX ORDER — POLYETHYLENE GLYCOL 3350 17 G/17G
17 POWDER, FOR SOLUTION ORAL DAILY
Qty: 75 PACKET | Refills: 0 | Status: SHIPPED | OUTPATIENT
Start: 2022-12-27 | End: 2023-02-10

## 2022-12-27 RX ORDER — LORATADINE 10 MG/1
10 TABLET ORAL DAILY
Qty: 30 TABLET | Refills: 0 | Status: SHIPPED | OUTPATIENT
Start: 2022-12-27 | End: 2023-01-26

## 2022-12-27 RX ADMIN — KETOROLAC TROMETHAMINE 30 MG: 30 INJECTION, SOLUTION INTRAMUSCULAR; INTRAVENOUS at 01:12

## 2022-12-27 NOTE — ED PROVIDER NOTES
Encounter Date: 12/27/2022       History     Chief Complaint   Patient presents with    URI     Cough and nasal congestion, body aches, headache, left abd pain and nausea, onset 3 days. chills     33 y/o female presents to the ED with complaints of cough, nasal congestion, and body aches for 2 days.  She also reports abdominal pain with constipation for 4 days.  Patient denies rash, fever, chest pain, SOB, numbness, weakness, tingling, back pain, dysuria, hematuria, nausea, vomiting, diarrhea, or any other complaints.  Patient rates the pain as 7/10 and has tried pepto bismol, naprosyn, and a muscle relaxer with no relief.  No Alleviating/aggravating factors.              The history is provided by the patient.   Review of patient's allergies indicates:  No Known Allergies  Past Medical History:   Diagnosis Date    Anemia     Arthritis     Asthma     Carpal tunnel syndrome     Complication of anesthesia     one sided epidural     Past Surgical History:   Procedure Laterality Date    TUBAL LIGATION      2013     Family History   Problem Relation Age of Onset    Hypertension Maternal Grandmother     Hyperlipidemia Maternal Grandmother     Breast cancer Maternal Grandmother         dx in age - unknown    Hypertension Mother     Asthma Sister     Asthma Son     Heart disease Maternal Aunt     Cancer Maternal Uncle     Colon cancer Maternal Uncle     Ovarian cancer Neg Hx      Social History     Tobacco Use    Smoking status: Never    Smokeless tobacco: Never   Substance Use Topics    Alcohol use: Yes     Alcohol/week: 2.0 standard drinks     Types: 2 Shots of liquor per week     Comment: social    Drug use: No     Review of Systems   Constitutional:  Negative for chills and fever.   HENT:  Positive for congestion. Negative for ear pain, rhinorrhea, sore throat and trouble swallowing.    Eyes:  Negative for pain, discharge and redness.   Respiratory:  Positive for cough. Negative for shortness of breath.     Cardiovascular:  Negative for chest pain.   Gastrointestinal:  Positive for abdominal pain and constipation. Negative for diarrhea, nausea and vomiting.   Genitourinary:  Negative for decreased urine volume and dysuria.   Musculoskeletal:  Positive for myalgias. Negative for back pain, neck pain and neck stiffness.   Skin:  Negative for rash.   Neurological:  Positive for headaches. Negative for dizziness, weakness, light-headedness and numbness.   Psychiatric/Behavioral:  Negative for confusion.      Physical Exam     Initial Vitals [12/27/22 1156]   BP Pulse Resp Temp SpO2   (!) 168/99 75 19 98.9 °F (37.2 °C) 96 %      MAP       --         Physical Exam    Nursing note and vitals reviewed.  Constitutional: She appears well-developed.  Non-toxic appearance. She does not appear ill.   HENT:   Head: Normocephalic and atraumatic.   Right Ear: Tympanic membrane and ear canal normal.   Left Ear: Tympanic membrane and ear canal normal.   Nose: Mucosal edema present.   Mouth/Throat: Uvula is midline, oropharynx is clear and moist and mucous membranes are normal.   Eyes: Conjunctivae are normal.   Neck: No Brudzinski's sign and no Kernig's sign noted.   Normal range of motion.  Cardiovascular:  Normal rate and regular rhythm.           Pulmonary/Chest: Effort normal and breath sounds normal. She exhibits no tenderness.   Abdominal: Abdomen is soft. Bowel sounds are normal. There is no abdominal tenderness.   No right CVA tenderness.  No left CVA tenderness. There is no rebound and no guarding.   Musculoskeletal:      Cervical back: Normal range of motion.     Lymphadenopathy:     She has no cervical adenopathy.   Neurological: She is alert and oriented to person, place, and time. Gait normal. GCS eye subscore is 4. GCS verbal subscore is 5. GCS motor subscore is 6.   Skin: Skin is warm, dry and intact. No rash noted.   Psychiatric: She has a normal mood and affect. Her speech is normal and behavior is normal. Judgment  and thought content normal.       ED Course   Procedures  Labs Reviewed   SARS-COV-2 RDRP GENE    Narrative:     This test utilizes isothermal nucleic acid amplification technology to detect the SARS-CoV-2 RdRp nucleic acid segment. The analytical sensitivity (limit of detection) is 500 copies/swab.     A POSITIVE result is indicative of the presence of SARS-CoV-2 RNA; clinical correlation with patient history and other diagnostic information is necessary to determine patient infection status.    A NEGATIVE result means that SARS-CoV-2 nucleic acids are not present above the limit of detection. A NEGATIVE result should be treated as presumptive. It does not rule out the possibility of COVID-19 and should not be the sole basis for treatment decisions. If COVID-19 is strongly suspected based on clinical and exposure history, re-testing using an alternate molecular assay should be considered.     This test is only for use under the Food and Drug Administration s Emergency Use Authorization (EUA).     Commercial kits are provided by [employer]. Performance characteristics of the EUA have been independently verified by [facility] Department of Pathology and Laboratory Medicine.   _________________________________________________________________   The authorized Fact Sheet for Healthcare Providers and the authorized Fact Sheet for Patients of the ID NOW COVID-19 are available on the FDA website:    https://www.fda.gov/media/214218/download      https://www.fda.gov/media/381287/download      POCT URINALYSIS W/O SCOPE   POCT URINE PREGNANCY   POCT URINALYSIS W/O SCOPE   POCT INFLUENZA A/B MOLECULAR   POCT RAPID INFLUENZA A/B          Imaging Results              X-Ray Abdomen Flat And Erect (Final result)  Result time 12/27/22 15:45:11      Final result by Ruperto Keen MD (12/27/22 15:45:11)                   Impression:      Nonobstructive bowel gas pattern.  Specifically, no significant colonic  stool burden to suggest constipation at this time.      Electronically signed by: Ruperto Keen MD  Date:    12/27/2022  Time:    15:45               Narrative:    EXAMINATION:  XR ABDOMEN FLAT AND ERECT    CLINICAL HISTORY:  constipation;    TECHNIQUE:  Flat and erect AP views of the abdomen were performed.    COMPARISON:  CT renal stone study 10/22/2020    FINDINGS:  Inferior-most aspect of the pelvis excluded from field of view.    Nonobstructive bowel gas pattern.  Prominent hepatic shadow likely reflecting hepatomegaly seen prior cross-sectional imaging.  No significant mass effect.  Known 3 mm right renal nephrolith not definitively seen on today's study.  No large amount of free air or abnormal intra-abdominal calcification seen.  Imaged lung bases are grossly clear.  Osseous structures appear intact.                                       Medications   ketorolac injection 30 mg (30 mg Intramuscular Given 12/27/22 1342)           APC / Resident Notes:   This is an urgent evaluation of a 32 y.o. female that presents to the Emergency Department for URI Symptoms for 2 days.  Also reports abdominal pain with constipation  The patient is a non-toxic, afebrile, and well appearing female. On physical exam: Ears: without infection.  Pharynx without infection. Appears well hydrated with moist mucus membranes. Neck soft and supple with no meningeal signs or cervical lymphadenopathy.  Breath sounds are clear and equal bilaterally with no adventitious breath sounds, tachypnea or respiratory distress.  Oxygen saturation is 96% on Room Air, no evidence of hypoxia.  Soft non-tender abdomen with no guarding or rebound, normal bowel sounds; no CVA or flank tenderness    Vital Signs: 168/99, 98.9, 75, 19, 96%  Lab\Radiology\Other Procedure RESULTS: UPT negative, UA negative for infection, Xray abd/pelvis showed constipation (reviewed by Dr. Ortega)  Flu: Negative  COVID-19: Negative    Given the above findings, my overall  impression is Viral URI with cough, allergic rhinitis, constipation. Given the above findings, I do not think the patient has pregnancy, UTI, COVID, Flu, OM, OE, strep pharyngitis, meningitis, pneumonia, bacterial sinusitis, or significant dehydration requiring IV fluids or admission    During her stay in the ED, the patient has been given toradol with good relief of her symptoms. The patient will be discharged home with miralax, tessalon perles, phenergan DM, flonase, claritin. Additional home care recommendations include Tylenol/Ibuprofen PRN, Hydration, return precautions. The diagnosis, treatment plan, instructions for follow-up and reevaluation with her PCP as well as ED return precautions have been discussed with the patient and she has verbalized an understanding of the information. All questions or concerns from the patient have been addressed.     This case was discussed with my attending Dr. Ortega who is in agreement with my assessment and plan.                     Clinical Impression:   Final diagnoses:  [J06.9] Viral URI with cough (Primary)  [K59.00] Constipation, unspecified constipation type  [J30.9] Allergic rhinitis, unspecified seasonality, unspecified trigger        ED Disposition Condition    Discharge Stable          ED Prescriptions       Medication Sig Dispense Start Date End Date Auth. Provider    acetaminophen (TYLENOL) 650 MG TbSR Take 1 tablet (650 mg total) by mouth every 8 (eight) hours. for 5 days 20 tablet 12/27/2022 1/1/2023 PAULY Carvalho    polyethylene glycol (GLYCOLAX) 17 gram PwPk Take 17 g by mouth once daily. 75 packet 12/27/2022 2/10/2023 PAULY Carvalho    fluticasone propionate (FLONASE) 50 mcg/actuation nasal spray 1 spray (50 mcg total) by Each Nostril route 2 (two) times daily as needed for Allergies or Rhinitis. 15 g 12/27/2022 1/10/2023 PAULY Carvalho    loratadine (CLARITIN) 10 mg tablet Take 1 tablet (10 mg total) by mouth once daily. 30 tablet 12/27/2022  1/26/2023 PAULY Carvalho    benzonatate (TESSALON) 100 MG capsule Take 1 capsule (100 mg total) by mouth 3 (three) times daily as needed for Cough. 30 capsule 12/27/2022 1/6/2023 PAULY Carvalho    promethazine-dextromethorphan (PROMETHAZINE-DM) 6.25-15 mg/5 mL Syrp Take 5 mLs by mouth nightly as needed (cough). 180 mL 12/27/2022 1/6/2023 PAULY Carvalho          Follow-up Information       Follow up With Specialties Details Why Contact Info    Nuvia Gaston NP Internal Medicine Schedule an appointment as soon as possible for a visit in 2 days  [address]  [facility] [facility] [id number] [phone]      [facility] ED Emergency Medicine Go to  If symptoms worsen [address] [facility] [address] [phone]             PAULY Carvalho  12/27/22 1227

## 2022-12-27 NOTE — DISCHARGE INSTRUCTIONS
Take 1 packet/capful of Miralax in 4 ounces of liquid every hour until you have 2 good bowel movements.  Then start 1 packet miralax daily-can increase to one packet/capful twice daily.  Make sure to drink plenty of fluids.      § Please return to the Emergency Department for any new or worsening symptoms including: fever, chest pain, shortness of breath, loss of consciousness, dizziness, weakness, or any other concerns.     § Schedule an appointment for follow up with your Primary Care Doctor as soon as possible for a recheck of your symptoms. If you do not have one, contact the one listed on your discharge paperwork or call the [facility] Appointment Desk at [phone] to schedule an appointment.     § If you require follow up care from a specialist and are unable to schedule an appointment with them directly, please contact your Primary Care Provider on the next business day to set up a referral.      § Please take all medication as prescribed.

## 2022-12-27 NOTE — Clinical Note
"Rachael Pro" Sajan was seen and treated in our emergency department on 12/27/2022.  She may return to work on 12/29/2022.       If you have any questions or concerns, please don't hesitate to call.      PAULY Carvalho"

## 2023-03-06 ENCOUNTER — HOSPITAL ENCOUNTER (EMERGENCY)
Facility: HOSPITAL | Age: 33
Discharge: HOME | End: 2023-03-06
Attending: EMERGENCY MEDICINE
Payer: MEDICAID

## 2023-03-06 VITALS
WEIGHT: 278 LBS | DIASTOLIC BLOOD PRESSURE: 92 MMHG | TEMPERATURE: 98 F | BODY MASS INDEX: 44.68 KG/M2 | HEART RATE: 82 BPM | SYSTOLIC BLOOD PRESSURE: 138 MMHG | RESPIRATION RATE: 20 BRPM | OXYGEN SATURATION: 97 % | HEIGHT: 66 IN

## 2023-03-06 DIAGNOSIS — M25.512 ACUTE PAIN OF LEFT SHOULDER: Primary | ICD-10-CM

## 2023-03-06 LAB
B-HCG UR QL: NEGATIVE
CTP QC/QA: YES

## 2023-03-06 PROCEDURE — 81025 URINE PREGNANCY TEST: CPT | Mod: ER | Performed by: EMERGENCY MEDICINE

## 2023-03-06 PROCEDURE — 63600175 PHARM REV CODE 636 W HCPCS: Mod: ER | Performed by: EMERGENCY MEDICINE

## 2023-03-06 PROCEDURE — 96372 THER/PROPH/DIAG INJ SC/IM: CPT | Performed by: EMERGENCY MEDICINE

## 2023-03-06 PROCEDURE — 99284 EMERGENCY DEPT VISIT MOD MDM: CPT | Mod: ER

## 2023-03-06 RX ORDER — CYCLOBENZAPRINE HCL 10 MG
10 TABLET ORAL 3 TIMES DAILY PRN
Qty: 15 TABLET | Refills: 0 | Status: SHIPPED | OUTPATIENT
Start: 2023-03-06 | End: 2023-03-11

## 2023-03-06 RX ORDER — DEXAMETHASONE SODIUM PHOSPHATE 4 MG/ML
8 INJECTION, SOLUTION INTRA-ARTICULAR; INTRALESIONAL; INTRAMUSCULAR; INTRAVENOUS; SOFT TISSUE
Status: COMPLETED | OUTPATIENT
Start: 2023-03-06 | End: 2023-03-06

## 2023-03-06 RX ORDER — NAPROXEN 500 MG/1
500 TABLET ORAL 2 TIMES DAILY WITH MEALS
Qty: 20 TABLET | Refills: 0 | Status: SHIPPED | OUTPATIENT
Start: 2023-03-06 | End: 2023-03-16

## 2023-03-06 RX ORDER — KETOROLAC TROMETHAMINE 30 MG/ML
15 INJECTION, SOLUTION INTRAMUSCULAR; INTRAVENOUS
Status: COMPLETED | OUTPATIENT
Start: 2023-03-06 | End: 2023-03-06

## 2023-03-06 RX ADMIN — KETOROLAC TROMETHAMINE 15 MG: 30 INJECTION, SOLUTION INTRAMUSCULAR; INTRAVENOUS at 12:03

## 2023-03-06 RX ADMIN — DEXAMETHASONE SODIUM PHOSPHATE 8 MG: 4 INJECTION INTRA-ARTICULAR; INTRALESIONAL; INTRAMUSCULAR; INTRAVENOUS; SOFT TISSUE at 12:03

## 2023-03-06 NOTE — Clinical Note
"Rachael Ramonfarhat Moeller was seen and treated in our emergency department on 3/6/2023.  She may return to work on 03/07/2023.       If you have any questions or concerns, please don't hesitate to call.      Betsy Paredes MD"

## 2023-03-06 NOTE — ED PROVIDER NOTES
"Encounter Date: 3/6/2023    SCRIBE #1 NOTE: I, Rosanna Lewis, am scribing for, and in the presence of,  Betsy Paredes MD. I have scribed the following portions of the note - Other sections scribed: HPI; ROS; PE.     History     Chief Complaint   Patient presents with    Shoulder Pain    Back Pain     Left upper back and left shoulder pain, woke yesterday with pain, denies injury, states increased with ROM, states "driving, I cant use my left arm to steer b/c of the pain"     Rachael Moeller is a 32 y.o. female who presents to the ED for chief complaint of anterior and posterior left shoulder pain onset 1 day ago. Patient reports the pain is exacerbated with movement. Patient attempted treatment with a heating pad and soaking in a hot epsom salt bath with no relief. She notes no trauma to the area. No further complaints at this time.    The history is provided by the patient. No  was used.   Review of patient's allergies indicates:  No Known Allergies  Past Medical History:   Diagnosis Date    Anemia     Arthritis     Asthma     Carpal tunnel syndrome     Complication of anesthesia     one sided epidural     Past Surgical History:   Procedure Laterality Date    TUBAL LIGATION      2013     Family History   Problem Relation Age of Onset    Hypertension Maternal Grandmother     Hyperlipidemia Maternal Grandmother     Breast cancer Maternal Grandmother         dx in age - unknown    Hypertension Mother     Asthma Sister     Asthma Son     Heart disease Maternal Aunt     Cancer Maternal Uncle     Colon cancer Maternal Uncle     Ovarian cancer Neg Hx      Social History     Tobacco Use    Smoking status: Never    Smokeless tobacco: Never   Substance Use Topics    Alcohol use: Yes     Alcohol/week: 2.0 standard drinks     Types: 2 Shots of liquor per week     Comment: social    Drug use: No     Review of Systems   Constitutional:  Negative for chills and fever.   HENT:  Negative for congestion and " sore throat.    Eyes:  Negative for pain.   Respiratory:  Negative for shortness of breath and wheezing.    Cardiovascular:  Negative for chest pain.   Gastrointestinal:  Negative for abdominal pain.   Genitourinary:  Negative for flank pain.   Musculoskeletal:  Positive for arthralgias (left shoulder). Negative for myalgias.   Skin:  Negative for color change.   Neurological:  Negative for weakness and headaches.   Hematological:  Does not bruise/bleed easily.   Psychiatric/Behavioral:  Negative for suicidal ideas.    All other systems reviewed and are negative.    Physical Exam     Initial Vitals [03/06/23 1118]   BP Pulse Resp Temp SpO2   (!) 146/98 84 20 98.4 °F (36.9 °C) 96 %      MAP       --         Physical Exam    Nursing note and vitals reviewed.  Constitutional: She appears well-developed.   HENT:   Head: Normocephalic.   Mouth/Throat: Oropharynx is clear and moist.   Eyes: Conjunctivae are normal.   Neck:   Normal range of motion.  Cardiovascular:  Regular rhythm.     Exam reveals no decreased pulses.       No murmur heard.  Pulmonary/Chest: No respiratory distress.   Abdominal: Abdomen is soft. She exhibits no distension.   Musculoskeletal:         General: Normal range of motion.      Cervical back: Normal range of motion.      Comments: Reproducible tenderness along the left shoulder girdle more so over the trapezius.      Neurological: She is alert.   Skin: Skin is warm and dry.   Psychiatric: She has a normal mood and affect.       ED Course   Procedures  Labs Reviewed   POCT URINE PREGNANCY          Imaging Results    None          Medications   ketorolac injection 15 mg (15 mg Intramuscular Given 3/6/23 1222)   dexAMETHasone injection 8 mg (8 mg Intramuscular Given 3/6/23 1221)     Medical Decision Making:   History:   Old Medical Records: I decided to obtain old medical records.  Initial Assessment:   This is an emergent evaluation of a 32-year-old woman who presented to the emergency department  today secondary to anterior and posterior left shoulder pain which began approximately 1 day ago.    Differential Diagnosis:   Differential diagnoses include musculoskeletal strain, sprain, fracture, dislocation.    Clinical Tests:   Lab Tests: Ordered and Reviewed  The following lab test(s) were unremarkable: UPT  ED Management:  On physical examination, patient had reproducible tenderness to palpation along the posterior shoulder girdle.  She had pain with movement of the shoulder but was able to range fully.  There was no crepitus.  She had no chest pain.  I have low suspicion for an emergent etiology of this at this time.  She was treated in the emergency department with ketorolac and dexamethasone and discharged home with anti-inflammatory medications and muscle relaxants.  She was advised close follow-up with primary care and given return precautions.  All questions and concerns were addressed and patient ambulated from the emergency department unassisted.    Betsy Paredes MD 12:25 PM  3/6/2023            Scribe Attestation:   Scribe #1: I performed the above scribed service and the documentation accurately describes the services I performed. I attest to the accuracy of the note.          I, Betsy Paredes MD, personally performed the services described in the documentation.  All medical records entries made by the scribe were made at my direction and in my presence.  I have reviewed the chart and agree that the record reflects my personal performance and is accurate and complete.        Clinical Impression:   Final diagnoses:  [M25.512] Acute pain of left shoulder (Primary)        ED Disposition Condition    Discharge Stable          ED Prescriptions       Medication Sig Dispense Start Date End Date Auth. Provider    naproxen (NAPROSYN) 500 MG tablet Take 1 tablet (500 mg total) by mouth 2 (two) times daily with meals. for 10 days 20 tablet 3/6/2023 3/16/2023 Betsy Paredes MD    cyclobenzaprine (FLEXERIL) 10 MG  tablet Take 1 tablet (10 mg total) by mouth 3 (three) times daily as needed for Muscle spasms. Do not drink alcohol, drive, or operate heavy machinery while taking this medication. 15 tablet 3/6/2023 3/11/2023 Betsy Paredes MD          Follow-up Information       Follow up With Specialties Details Why Contact Info    uNvia Gaston NP Internal Medicine Schedule an appointment as soon as possible for a visit in 1 week  [address] [facility] [address]  [phone]               Betsy Paredes MD  03/09/23 2651

## 2023-03-06 NOTE — DISCHARGE INSTRUCTIONS

## 2023-04-04 ENCOUNTER — PATIENT MESSAGE (OUTPATIENT)
Dept: RESEARCH | Facility: HOSPITAL | Age: 33
End: 2023-04-04
Payer: MEDICAID

## 2024-05-22 ENCOUNTER — HOSPITAL ENCOUNTER (EMERGENCY)
Facility: HOSPITAL | Age: 34
Discharge: HOME | End: 2024-05-22
Attending: STUDENT IN AN ORGANIZED HEALTH CARE EDUCATION/TRAINING PROGRAM
Payer: MEDICAID

## 2024-05-22 VITALS
DIASTOLIC BLOOD PRESSURE: 73 MMHG | HEART RATE: 60 BPM | OXYGEN SATURATION: 100 % | SYSTOLIC BLOOD PRESSURE: 177 MMHG | TEMPERATURE: 98 F | RESPIRATION RATE: 18 BRPM

## 2024-05-22 DIAGNOSIS — R52 PAIN: ICD-10-CM

## 2024-05-22 DIAGNOSIS — M19.011 PRIMARY OSTEOARTHRITIS OF RIGHT SHOULDER: Primary | ICD-10-CM

## 2024-05-22 DIAGNOSIS — S39.012A BACK STRAIN, INITIAL ENCOUNTER: ICD-10-CM

## 2024-05-22 PROCEDURE — 99284 EMERGENCY DEPT VISIT MOD MDM: CPT | Mod: 25

## 2024-05-22 PROCEDURE — 25000003 PHARM REV CODE 250: Performed by: STUDENT IN AN ORGANIZED HEALTH CARE EDUCATION/TRAINING PROGRAM

## 2024-05-22 PROCEDURE — 63600175 PHARM REV CODE 636 W HCPCS: Performed by: STUDENT IN AN ORGANIZED HEALTH CARE EDUCATION/TRAINING PROGRAM

## 2024-05-22 PROCEDURE — 96372 THER/PROPH/DIAG INJ SC/IM: CPT | Performed by: STUDENT IN AN ORGANIZED HEALTH CARE EDUCATION/TRAINING PROGRAM

## 2024-05-22 RX ORDER — LIDOCAINE 50 MG/G
1 PATCH TOPICAL DAILY
Qty: 5 PATCH | Refills: 0 | Status: SHIPPED | OUTPATIENT
Start: 2024-05-22

## 2024-05-22 RX ORDER — LIDOCAINE 50 MG/G
1 PATCH TOPICAL
Status: DISCONTINUED | OUTPATIENT
Start: 2024-05-22 | End: 2024-05-22 | Stop reason: HOSPADM

## 2024-05-22 RX ORDER — METHYLPREDNISOLONE 4 MG/1
TABLET ORAL
Qty: 21 EACH | Refills: 0 | Status: SHIPPED | OUTPATIENT
Start: 2024-05-22 | End: 2024-06-12

## 2024-05-22 RX ORDER — CYCLOBENZAPRINE HCL 5 MG
10 TABLET ORAL
Status: COMPLETED | OUTPATIENT
Start: 2024-05-22 | End: 2024-05-22

## 2024-05-22 RX ORDER — CYCLOBENZAPRINE HCL 10 MG
10 TABLET ORAL 3 TIMES DAILY PRN
Qty: 21 TABLET | Refills: 0 | Status: SHIPPED | OUTPATIENT
Start: 2024-05-22 | End: 2024-05-29

## 2024-05-22 RX ORDER — IBUPROFEN 600 MG/1
600 TABLET ORAL
Status: COMPLETED | OUTPATIENT
Start: 2024-05-22 | End: 2024-05-22

## 2024-05-22 RX ORDER — DEXAMETHASONE SODIUM PHOSPHATE 4 MG/ML
8 INJECTION, SOLUTION INTRA-ARTICULAR; INTRALESIONAL; INTRAMUSCULAR; INTRAVENOUS; SOFT TISSUE ONCE
Status: COMPLETED | OUTPATIENT
Start: 2024-05-22 | End: 2024-05-22

## 2024-05-22 RX ADMIN — LIDOCAINE 1 PATCH: 700 PATCH TOPICAL at 06:05

## 2024-05-22 RX ADMIN — IBUPROFEN 600 MG: 600 TABLET, FILM COATED ORAL at 06:05

## 2024-05-22 RX ADMIN — DEXAMETHASONE SODIUM PHOSPHATE 8 MG: 4 INJECTION INTRA-ARTICULAR; INTRALESIONAL; INTRAMUSCULAR; INTRAVENOUS; SOFT TISSUE at 06:05

## 2024-05-22 RX ADMIN — CYCLOBENZAPRINE HYDROCHLORIDE 10 MG: 5 TABLET, FILM COATED ORAL at 06:05

## 2024-05-22 NOTE — ED NOTES
Rachael Moeller, a 33 y.o. female presents to the ED w/ complaint of Back Pain     Back Pain x3 days 10/10. Denies injury. Denies bowel/bladder incontinence     Triage note:  Chief Complaint   Patient presents with    Back Pain     X3 days, denies known injury     Review of patient's allergies indicates:  No Known Allergies  Past Medical History:   Diagnosis Date    Anemia     Arthritis     Asthma     Carpal tunnel syndrome     Complication of anesthesia     one sided epidural     APPEARANCE: No acute distress.    NEURO: Awake, alert, appropriate for age  HEENT: Head symmetrical. No obvious deformity  RESPIRATORY: Airway is open and patent. Respirations are spontaneous on room air.   NEUROVASCULAR: All extremities are warm and pink with capillary refill less than 3 seconds.   MUSCULOSKELETAL: Moves all extremities, wiggling toes and moving hands. C/o back pain   SKIN: Warm and dry, adequate turgor, mucus membranes moist and pink    Will continue to monitor.

## 2024-05-22 NOTE — ED PROVIDER NOTES
Encounter Date: 5/22/2024       History     Chief Complaint   Patient presents with    Back Pain     X3 days, denies known injury     HPI    33-year-old female past medical history asthma who presents to the ER for evaluation of back pain/shoulder pain/ Patient states she has developed right shoulder pain for the last 3 days.  No falls or known inciting event.  She works as a manager at [employer].  No medications taken prior to arrival.  She denies any numbness, tingling, falls, chest pain, or any shortness of breath.      Review of patient's allergies indicates:  No Known Allergies  Past Medical History:   Diagnosis Date    Anemia     Arthritis     Asthma     Carpal tunnel syndrome     Complication of anesthesia     one sided epidural     Past Surgical History:   Procedure Laterality Date    TUBAL LIGATION      2013     Family History   Problem Relation Name Age of Onset    Hypertension Maternal Grandmother      Hyperlipidemia Maternal Grandmother      Breast cancer Maternal Grandmother          dx in age - unknown    Hypertension Mother      Asthma Sister      Asthma Son      Heart disease Maternal Aunt      Cancer Maternal Uncle      Colon cancer Maternal Uncle      Ovarian cancer Neg Hx       Social History     Tobacco Use    Smoking status: Never    Smokeless tobacco: Never   Substance Use Topics    Alcohol use: Yes     Alcohol/week: 2.0 standard drinks of alcohol     Types: 2 Shots of liquor per week     Comment: social    Drug use: No     Review of Systems   Respiratory:  Negative for cough.    Cardiovascular:  Negative for chest pain.   Musculoskeletal:  Positive for arthralgias and myalgias.   Neurological:  Negative for numbness.       Physical Exam     Initial Vitals [05/22/24 0603]   BP Pulse Resp Temp SpO2   (!) 135/92 88 20 97.9 °F (36.6 °C) 99 %      MAP       --         Physical Exam    Nursing note and vitals reviewed.  Constitutional: She appears well-nourished.   HENT:   Head: Atraumatic.    Cardiovascular:  Normal rate and regular rhythm.           Pulmonary/Chest: Breath sounds normal. No respiratory distress.   Musculoskeletal:      Comments: Pain with range of motion of the right shoulder, 2+ radial pulses right upper extremity, distal sensation intact right upper extremity, no deformity of the right shoulder     Neurological: She is alert and oriented to person, place, and time.   Skin: Skin is warm and dry.   Psychiatric: She has a normal mood and affect. Thought content normal.         ED Course   Procedures  Labs Reviewed - No data to display         Imaging Results              X-Ray Shoulder Trauma 3 view Right (Final result)  Result time 05/22/24 07:39:59   Procedure changed from X-Ray Shoulder Complete 2 View Right     Final result by Giles Damon MD (05/22/24 07:39:59)                   Impression:      As above.      Electronically signed by: Giles Damon MD  Date:    05/22/2024  Time:    07:39               Narrative:    EXAMINATION:  XR SHOULDER TRAUMA 3 VIEW RIGHT    CLINICAL HISTORY:  Pain;Pain, unspecified    TECHNIQUE:  Three or four views of the right shoulder were performed.    FINDINGS:  There is degenerative change at the acromioclavicular joint.  There is no acute fracture, dislocation, or bony erosion.                                       Medications   ibuprofen tablet 600 mg (600 mg Oral Given 5/22/24 0633)   dexAMETHasone injection 8 mg (8 mg Intramuscular Given 5/22/24 0633)   cyclobenzaprine tablet 10 mg (10 mg Oral Given 5/22/24 0633)     Medical Decision Making  Amount and/or Complexity of Data Reviewed  Radiology: ordered and independent interpretation performed. Decision-making details documented in ED Course.    Risk  Prescription drug management.                                  33-year-old female presenting with shoulder pain.  Vital signs stable in emergency room, physical exam as above.  Neurovascularly intact, no deformities noted.  She was given p.o., IM,  lidocaine medications here.  On re-evaluation she was feeling a little better.  X-ray of the shoulder with no acute fracture/dislocation, degenerative changes were noted.  Likely muscular versus arthritic pain in nature.  We will give script for home and referral to orthopedics.  All questions answered, strict return precautions were given, she expressed understanding, discharged home.      Clinical Impression:  Final diagnoses:  [R52] Pain  [S39.012A] Back strain, initial encounter  [M19.011] Primary osteoarthritis of right shoulder (Primary)          ED Disposition Condition    Discharge Stable          ED Prescriptions       Medication Sig Dispense Start Date End Date Auth. Provider    LIDOcaine (LIDODERM) 5 % Place 1 patch onto the skin once daily. Remove & Discard patch within 12 hours or as directed by MD 5 patch 5/22/2024 -- Yair Mendez MD    methylPREDNISolone (MEDROL DOSEPACK) 4 mg tablet use as directed 21 each 5/22/2024 6/12/2024 Yair Mendez MD    cyclobenzaprine (FLEXERIL) 10 MG tablet Take 1 tablet (10 mg total) by mouth 3 (three) times daily as needed for Muscle spasms. 21 tablet 5/22/2024 5/29/2024 Yair Mendez MD          Follow-up Information    None          Yair Mendez MD  05/22/24 0510

## 2024-10-02 ENCOUNTER — HOSPITAL ENCOUNTER (EMERGENCY)
Facility: HOSPITAL | Age: 34
Discharge: HOME | End: 2024-10-02
Attending: EMERGENCY MEDICINE
Payer: MEDICAID

## 2024-10-02 VITALS
DIASTOLIC BLOOD PRESSURE: 77 MMHG | RESPIRATION RATE: 18 BRPM | TEMPERATURE: 99 F | HEIGHT: 66 IN | WEIGHT: 242 LBS | BODY MASS INDEX: 38.89 KG/M2 | HEART RATE: 69 BPM | OXYGEN SATURATION: 99 % | SYSTOLIC BLOOD PRESSURE: 120 MMHG

## 2024-10-02 DIAGNOSIS — J02.9 SORE THROAT: ICD-10-CM

## 2024-10-02 DIAGNOSIS — N30.00 ACUTE CYSTITIS WITHOUT HEMATURIA: Primary | ICD-10-CM

## 2024-10-02 LAB
ALBUMIN SERPL-MCNC: 4.4 G/DL (ref 3.3–5.5)
ALP SERPL-CCNC: 62 U/L (ref 42–141)
B-HCG UR QL: NEGATIVE
BILIRUB SERPL-MCNC: 1.9 MG/DL (ref 0.2–1.6)
BILIRUBIN, POC UA: NEGATIVE
BLOOD, POC UA: ABNORMAL
BUN SERPL-MCNC: 7 MG/DL (ref 7–22)
CALCIUM SERPL-MCNC: 10.1 MG/DL (ref 8–10.3)
CHLORIDE SERPL-SCNC: 108 MMOL/L (ref 98–108)
CLARITY, UA: CLEAR
COLOR, UA: YELLOW
CREAT SERPL-MCNC: 0.8 MG/DL (ref 0.6–1.2)
CTP QC/QA: YES
GLUCOSE SERPL-MCNC: 105 MG/DL (ref 73–118)
GLUCOSE, POC UA: NEGATIVE
HCT, POC: NORMAL
HGB, POC: NORMAL (ref 14–18)
KETONES, POC UA: NEGATIVE
LEUKOCYTE EST, POC UA: ABNORMAL
MCH, POC: NORMAL
MCHC, POC: NORMAL
MCV, POC: NORMAL
MPV, POC: NORMAL
NITRITE, POC UA: NEGATIVE
PH UR STRIP: 7 [PH] (ref 5–8)
POC ALT (SGPT): 7 U/L (ref 10–47)
POC AST (SGOT): 19 U/L (ref 11–38)
POC PLATELET COUNT: NORMAL
POC RAPID STREP A: NEGATIVE
POC TCO2: 26 MMOL/L (ref 18–33)
POTASSIUM BLD-SCNC: 3.9 MMOL/L (ref 3.6–5.1)
PROTEIN, POC UA: ABNORMAL
PROTEIN, POC: 7.5 G/DL (ref 6.4–8.1)
RBC, POC: NORMAL
RDW, POC: NORMAL
SODIUM BLD-SCNC: 144 MMOL/L (ref 128–145)
SPECIFIC GRAVITY, POC UA: 1.02 (ref 1–1.03)
UROBILINOGEN, POC UA: 1 E.U./DL
WBC, POC: NORMAL

## 2024-10-02 PROCEDURE — 80053 COMPREHEN METABOLIC PANEL: CPT | Mod: ER

## 2024-10-02 PROCEDURE — 81025 URINE PREGNANCY TEST: CPT | Mod: ER | Performed by: EMERGENCY MEDICINE

## 2024-10-02 PROCEDURE — 63600175 PHARM REV CODE 636 W HCPCS: Mod: ER | Performed by: NURSE PRACTITIONER

## 2024-10-02 PROCEDURE — 96361 HYDRATE IV INFUSION ADD-ON: CPT | Mod: ER

## 2024-10-02 PROCEDURE — 87086 URINE CULTURE/COLONY COUNT: CPT | Performed by: NURSE PRACTITIONER

## 2024-10-02 PROCEDURE — 96374 THER/PROPH/DIAG INJ IV PUSH: CPT | Mod: ER

## 2024-10-02 PROCEDURE — 99284 EMERGENCY DEPT VISIT MOD MDM: CPT | Mod: 25,ER

## 2024-10-02 PROCEDURE — 25000003 PHARM REV CODE 250: Mod: ER | Performed by: NURSE PRACTITIONER

## 2024-10-02 PROCEDURE — 85025 COMPLETE CBC W/AUTO DIFF WBC: CPT | Mod: ER

## 2024-10-02 PROCEDURE — 87880 STREP A ASSAY W/OPTIC: CPT | Mod: ER

## 2024-10-02 PROCEDURE — 81025 URINE PREGNANCY TEST: CPT | Mod: ER

## 2024-10-02 RX ORDER — CEPHALEXIN 500 MG/1
500 CAPSULE ORAL
Status: COMPLETED | OUTPATIENT
Start: 2024-10-02 | End: 2024-10-02

## 2024-10-02 RX ORDER — CEPHALEXIN 500 MG/1
500 CAPSULE ORAL EVERY 6 HOURS
Qty: 40 CAPSULE | Refills: 0 | Status: SHIPPED | OUTPATIENT
Start: 2024-10-02 | End: 2024-10-12

## 2024-10-02 RX ORDER — ONDANSETRON 4 MG/1
4 TABLET, ORALLY DISINTEGRATING ORAL EVERY 8 HOURS PRN
Qty: 12 TABLET | Refills: 0 | Status: SHIPPED | OUTPATIENT
Start: 2024-10-02

## 2024-10-02 RX ORDER — ONDANSETRON HYDROCHLORIDE 2 MG/ML
4 INJECTION, SOLUTION INTRAVENOUS
Status: COMPLETED | OUTPATIENT
Start: 2024-10-02 | End: 2024-10-02

## 2024-10-02 RX ADMIN — SODIUM CHLORIDE 1000 ML: 9 INJECTION, SOLUTION INTRAVENOUS at 12:10

## 2024-10-02 RX ADMIN — CEPHALEXIN 500 MG: 500 CAPSULE ORAL at 01:10

## 2024-10-02 RX ADMIN — ONDANSETRON 4 MG: 2 INJECTION INTRAMUSCULAR; INTRAVENOUS at 12:10

## 2024-10-02 NOTE — Clinical Note
"Rachael Pro" Sajan was seen and treated in our emergency department on 10/2/2024.  She may return to work on 10/03/2024.       If you have any questions or concerns, please don't hesitate to call.      Misha LYMAN RN    "

## 2024-10-02 NOTE — DISCHARGE INSTRUCTIONS
Take antibiotics as ordered. Zofran for nausea/vomiting.  Push fluids.  Return to the Emergency Department for any worsening, change in condition, or any emergent concerns.

## 2024-10-02 NOTE — ED PROVIDER NOTES
Encounter Date: 10/2/2024       History     Chief Complaint   Patient presents with    Abdominal Pain    Back Pain     A 32 y/o female presents to the ER c/o ABD pain and back pain x 3 days. No home treatments. Denies V/D, SOB, and CP.      Chief complaint: Sore throat     History of present illness: Patient is a 33-year-old female who reports having a sore throat and generalized abdominal pain that radiates into the back for the last 3 days.  She endorses decreased appetite subjective fevers sore throat abdominal pain nausea without vomiting or diarrhea, last bowel movement yesterday, denies vaginal bleeding or discharge urinary frequency urgency and dysuria.    The history is provided by the patient. No  was used.     Review of patient's allergies indicates:  No Known Allergies  Past Medical History:   Diagnosis Date    Anemia     Arthritis     Asthma     Carpal tunnel syndrome     Complication of anesthesia     one sided epidural     Past Surgical History:   Procedure Laterality Date    TUBAL LIGATION      2013     Family History   Problem Relation Name Age of Onset    Hypertension Maternal Grandmother      Hyperlipidemia Maternal Grandmother      Breast cancer Maternal Grandmother          dx in age - unknown    Hypertension Mother      Asthma Sister      Asthma Son      Heart disease Maternal Aunt      Cancer Maternal Uncle      Colon cancer Maternal Uncle      Ovarian cancer Neg Hx       Social History     Tobacco Use    Smoking status: Never    Smokeless tobacco: Never   Substance Use Topics    Alcohol use: Yes     Alcohol/week: 2.0 standard drinks of alcohol     Types: 2 Shots of liquor per week     Comment: social    Drug use: No     Review of Systems   Constitutional:  Positive for appetite change and fever.   HENT:  Positive for sore throat.    Gastrointestinal:  Positive for abdominal pain, constipation and nausea. Negative for diarrhea and vomiting.   Genitourinary:  Negative for  dysuria, frequency, urgency, vaginal bleeding and vaginal discharge.       Physical Exam     Initial Vitals [10/02/24 1135]   BP Pulse Resp Temp SpO2   (!) 148/90 80 18 98.7 °F (37.1 °C) 100 %      MAP       --         Physical Exam    Nursing note and vitals reviewed.  Constitutional: She appears well-developed and well-nourished.   HENT:   Head: Normocephalic and atraumatic.   Right Ear: Hearing, tympanic membrane, external ear and ear canal normal.   Left Ear: Hearing, tympanic membrane, external ear and ear canal normal.   Nose: Nose normal. No mucosal edema or rhinorrhea. No epistaxis. Right sinus exhibits no maxillary sinus tenderness and no frontal sinus tenderness. Left sinus exhibits no maxillary sinus tenderness and no frontal sinus tenderness. Mouth/Throat: Uvula is midline, oropharynx is clear and moist and mucous membranes are normal. No oral lesions. Normal dentition.   Eyes: Conjunctivae and EOM are normal. Pupils are equal, round, and reactive to light. Right eye exhibits no discharge. Left eye exhibits no discharge.   Neck:   Normal range of motion.  Abdominal: Abdomen is soft. Bowel sounds are normal. She exhibits no distension. There is no abdominal tenderness.   Musculoskeletal:         General: Normal range of motion.      Cervical back: Normal range of motion.     Neurological: She is alert and oriented to person, place, and time.   Skin: Skin is dry. Capillary refill takes less than 2 seconds.         ED Course   Procedures  Labs Reviewed   POCT URINALYSIS W/O SCOPE - Abnormal       Result Value    Glucose, UA Negative      Bilirubin, UA Negative      Ketones, UA Negative      Spec Grav UA 1.020      Blood, UA Trace-intact (*)     PH, UA 7.0      Protein, UA 1+ (*)     Urobilinogen, UA 1.0      Nitrite, UA Negative      Leukocytes, UA Trace (*)     Color, UA POC Yellow      Clarity, UA, POC Clear     POCT CMP - Abnormal    Albumin, POC 4.4      Alkaline Phosphatase, POC 62      ALT (SGPT), POC  7 (*)     AST (SGOT), POC 19      POC BUN 7      Calcium, POC 10.1      POC Chloride 108      POC Creatinine 0.8      POC Glucose 105      POC Potassium 3.9      POC Sodium 144      Bilirubin, POC 1.9 (*)     POC TCO2 26      Protein, POC 7.5     CULTURE, URINE   POCT URINE PREGNANCY    POC Preg Test, Ur Negative       Acceptable Yes     POCT URINALYSIS W/O SCOPE   POCT CBC    Hematocrit        Hemoglobin        RBC        WBC        MCV        MCH, POC        MCHC        RDW-CV        Platelet Count, POC        MPV       POCT CMP   POCT STREP A, RAPID    POC Rapid Strep A negative            Imaging Results              X-Ray Neck Soft Tissue (Final result)  Result time 10/02/24 13:50:15      Final result by Jorge Bryant III, MD (10/02/24 13:50:15)                   Impression:      No significant abnormality seen.      Electronically signed by: Jorge Bryant MD  Date:    10/02/2024  Time:    13:50               Narrative:    EXAMINATION:  XR NECK SOFT TISSUE    CLINICAL HISTORY:  Acute pharyngitis, unspecified    FINDINGS:  Tonsils and adenoids are normal.  Prevertebral soft tissues are.  Epiglottis is.  No foreign body seen.                                       Medications   sodium chloride 0.9% bolus 1,000 mL 1,000 mL (0 mLs Intravenous Stopped 10/2/24 1319)   ondansetron injection 4 mg (4 mg Intravenous Given 10/2/24 1219)   cephALEXin capsule 500 mg (500 mg Oral Given 10/2/24 1301)     Medical Decision Making  Patient is a 33-year-old female who reports having a sore throat and generalized abdominal pain that radiates into the back for the last 3 days.  She endorses decreased appetite subjective fevers sore throat abdominal pain nausea without vomiting or diarrhea, last bowel movement yesterday, denies vaginal bleeding or discharge urinary frequency urgency and dysuria.    On physical exam, the patient is Sitting in chair at bedside and In NAD.  Respiratory effort is unlabored. BP (!)  "148/90 (BP Location: Left arm)   Pulse 80   Temp 98.7 °F (37.1 °C) (Oral)   Resp 18   Ht 5' 6" (1.676 m)   Wt 109.8 kg (242 lb)   SpO2 100%   BMI 39.06 kg/m²   Ears: normal TM's and external ear canals both ears  Nose: Nares normal. Septum midline. Mucosa normal. No drainage or sinus tenderness.  Throat: normal findings: lips normal without lesions, buccal mucosa normal, gums healthy, teeth intact, non-carious, palate normal, tongue midline and normal, soft palate, uvula, and tonsils normal, palpation of salivary glands negative, and oropharynx pink & moist without lesions or evidence of thrush  Abdomen: soft, non-tender; bowel sounds normal; no masses,  no organomegaly  Skin: Skin color, texture, turgor normal. No rashes or lesions    Differential diagnoses includes but is not limited to (upper abd) pancreatitis, cholelithiasis, cholecystitis, gastritis, gastroenteritis, (lower abd) diverticulitis, appendicitis, colitis, illeus, SBO, and strep throat          Problems Addressed:  Acute cystitis without hematuria: acute illness or injury     Details: Started on Keflex in the ER  Sore throat: acute illness or injury     Details: Likely viral pharyngitis.    Amount and/or Complexity of Data Reviewed  Labs: ordered. Decision-making details documented in ED Course.  Radiology: ordered.  Discussion of management or test interpretation with external provider(s): Vital signs at the time of disposition were:  /77 (BP Location: Right arm, Patient Position: Lying)   Pulse 69   Temp 98.6 °F (37 °C)   Resp 18   Ht 5' 6" (1.676 m)   Wt 109.8 kg (242 lb)   SpO2 99%   Breastfeeding No   BMI 39.06 kg/m²       See AVS for additional recommendations. Medications listed herein were prescribed after reviewing the patient's allergies, medication list, history, most recent laboratories as available.  Referrals below were provided after reviewing the patient's previous medical providers. She understands she  should " return for any worsening or changes in condition.  Prior to discharge the patient was asked if she  had any additional concerns or complaints and she declined. The patient was given an opportunity to ask questions and all were answered to her satisfaction.     Risk  Prescription drug management.  Diagnosis or treatment significantly limited by social determinants of health.               ED Course as of 10/02/24 1735   Wed Oct 02, 2024   1254 POC Rapid Strep A: negative [VC]   1254 POCT CMP(!)  Normal cmp. [VC]   1254 POCT URINALYSIS W/O SCOPE(!)  Possible uti will treat and culture. [VC]   1255 hCG Qualitative, Urine: Negative [VC]   1255 POC Rapid Strep A: negative [VC]   1255 POCT CBC  Normal cbc. [VC]   1255 BP(!): 148/90 [VC]   1255 Temp: 98.7 °F (37.1 °C) [VC]   1255 Temp Source: Oral [VC]   1255 Pulse: 80 [VC]   1255 Resp: 18 [VC]   1255 SpO2: 100 % [VC]      ED Course User Index  [VC] Marc Estrada DNP                           Clinical Impression:  Final diagnoses:  [J02.9] Sore throat  [N30.00] Acute cystitis without hematuria (Primary)          ED Disposition Condition    Discharge Stable          ED Prescriptions       Medication Sig Dispense Start Date End Date Auth. Provider    cephALEXin (KEFLEX) 500 MG capsule Take 1 capsule (500 mg total) by mouth every 6 (six) hours. for 10 days 40 capsule 10/2/2024 10/12/2024 Marc Estrada DNP    ondansetron (ZOFRAN-ODT) 4 MG TbDL Take 1 tablet (4 mg total) by mouth every 8 (eight) hours as needed (nausea/vomiting). 12 tablet 10/2/2024 -- Marc Estrada DNP          Follow-up Information       Follow up With Specialties Details Why Contact Info    Cony Lau FNP Family Medicine Schedule an appointment as soon as possible for a visit   [address] [address]  [phone]               Marc Estrada DNP  10/02/24 2171

## 2024-10-02 NOTE — Clinical Note
"Rachael Pro" Sajan was seen and treated in our emergency department on 10/2/2024.  She may return to work on 10/03/2024.       If you have any questions or concerns, please don't hesitate to call.      Misha LYMAN LPN    "

## 2024-10-02 NOTE — ED NOTES
Pt given a labeled urine specimen cup and informed that urine sample is needed.   Glabellar Complex Units: 21

## 2024-10-04 LAB — BACTERIA UR CULT: NORMAL

## 2024-11-22 ENCOUNTER — PATIENT MESSAGE (OUTPATIENT)
Dept: RESEARCH | Facility: HOSPITAL | Age: 34
End: 2024-11-22
Payer: MEDICAID

## 2025-01-08 ENCOUNTER — HOSPITAL ENCOUNTER (EMERGENCY)
Facility: HOSPITAL | Age: 35
Discharge: HOME | End: 2025-01-08
Attending: EMERGENCY MEDICINE
Payer: MEDICAID

## 2025-01-08 VITALS
WEIGHT: 234 LBS | DIASTOLIC BLOOD PRESSURE: 99 MMHG | BODY MASS INDEX: 37.77 KG/M2 | OXYGEN SATURATION: 100 % | TEMPERATURE: 99 F | SYSTOLIC BLOOD PRESSURE: 142 MMHG | HEART RATE: 81 BPM | RESPIRATION RATE: 18 BRPM

## 2025-01-08 DIAGNOSIS — N39.0 ACUTE UTI: ICD-10-CM

## 2025-01-08 DIAGNOSIS — J06.9 VIRAL URI WITH COUGH: Primary | ICD-10-CM

## 2025-01-08 LAB
B-HCG UR QL: NEGATIVE
BILIRUBIN, POC UA: NEGATIVE
BLOOD, POC UA: NEGATIVE
CLARITY, UA: CLEAR
COLOR, UA: YELLOW
CTP QC/QA: YES
CTP QC/QA: YES
GLUCOSE, POC UA: NEGATIVE
INFLUENZA A ANTIGEN, POC: NEGATIVE
INFLUENZA B ANTIGEN, POC: NEGATIVE
KETONES, POC UA: NEGATIVE
LEUKOCYTE EST, POC UA: ABNORMAL
NITRITE, POC UA: NEGATIVE
PH UR STRIP: 6 [PH] (ref 5–8)
POC RAPID STREP A: NEGATIVE
PROTEIN, POC UA: ABNORMAL
SARS-COV-2 RDRP RESP QL NAA+PROBE: NEGATIVE
SPECIFIC GRAVITY, POC UA: 1.02 (ref 1–1.03)
UROBILINOGEN, POC UA: 1 E.U./DL

## 2025-01-08 PROCEDURE — 81003 URINALYSIS AUTO W/O SCOPE: CPT | Mod: ER

## 2025-01-08 PROCEDURE — 87635 SARS-COV-2 COVID-19 AMP PRB: CPT | Mod: ER | Performed by: EMERGENCY MEDICINE

## 2025-01-08 PROCEDURE — 87804 INFLUENZA ASSAY W/OPTIC: CPT | Mod: ER

## 2025-01-08 PROCEDURE — 87086 URINE CULTURE/COLONY COUNT: CPT | Performed by: EMERGENCY MEDICINE

## 2025-01-08 PROCEDURE — 99284 EMERGENCY DEPT VISIT MOD MDM: CPT | Mod: ER

## 2025-01-08 PROCEDURE — 25000003 PHARM REV CODE 250: Mod: ER | Performed by: EMERGENCY MEDICINE

## 2025-01-08 PROCEDURE — 87880 STREP A ASSAY W/OPTIC: CPT | Mod: ER

## 2025-01-08 PROCEDURE — 81025 URINE PREGNANCY TEST: CPT | Mod: ER | Performed by: EMERGENCY MEDICINE

## 2025-01-08 RX ORDER — LORATADINE 10 MG/1
10 TABLET ORAL DAILY
Qty: 60 TABLET | Refills: 0 | Status: SHIPPED | OUTPATIENT
Start: 2025-01-08 | End: 2026-01-08

## 2025-01-08 RX ORDER — AMOXICILLIN AND CLAVULANATE POTASSIUM 875; 125 MG/1; MG/1
1 TABLET, FILM COATED ORAL 2 TIMES DAILY
Qty: 14 TABLET | Refills: 0 | Status: SHIPPED | OUTPATIENT
Start: 2025-01-08

## 2025-01-08 RX ORDER — FLUTICASONE PROPIONATE 50 MCG
1 SPRAY, SUSPENSION (ML) NASAL 2 TIMES DAILY
Qty: 16 G | Refills: 0 | Status: SHIPPED | OUTPATIENT
Start: 2025-01-08

## 2025-01-08 RX ORDER — PHENAZOPYRIDINE HYDROCHLORIDE 200 MG/1
200 TABLET, FILM COATED ORAL 3 TIMES DAILY PRN
Qty: 6 TABLET | Refills: 0 | Status: SHIPPED | OUTPATIENT
Start: 2025-01-08 | End: 2025-01-18

## 2025-01-08 RX ORDER — HYDROXYZINE PAMOATE 25 MG/1
25 CAPSULE ORAL
Status: COMPLETED | OUTPATIENT
Start: 2025-01-08 | End: 2025-01-08

## 2025-01-08 RX ORDER — ACETAMINOPHEN 500 MG
1000 TABLET ORAL
Status: COMPLETED | OUTPATIENT
Start: 2025-01-08 | End: 2025-01-08

## 2025-01-08 RX ORDER — HYDROXYZINE HYDROCHLORIDE 25 MG/1
25 TABLET, FILM COATED ORAL 3 TIMES DAILY PRN
Qty: 12 TABLET | Refills: 0 | Status: SHIPPED | OUTPATIENT
Start: 2025-01-08

## 2025-01-08 RX ORDER — MICONAZOLE NITRATE 200 MG/1
200 SUPPOSITORY VAGINAL NIGHTLY
Qty: 3 SUPPOSITORY | Refills: 1 | Status: SHIPPED | OUTPATIENT
Start: 2025-01-08 | End: 2025-01-11

## 2025-01-08 RX ORDER — IBUPROFEN 600 MG/1
600 TABLET ORAL EVERY 6 HOURS PRN
Qty: 20 TABLET | Refills: 0 | Status: SHIPPED | OUTPATIENT
Start: 2025-01-08

## 2025-01-08 RX ORDER — VITAMIN A 3000 MCG
1 CAPSULE ORAL
Qty: 30 ML | Refills: 0 | Status: SHIPPED | OUTPATIENT
Start: 2025-01-08

## 2025-01-08 RX ORDER — BENZONATATE 200 MG/1
200 CAPSULE ORAL 3 TIMES DAILY PRN
Qty: 30 CAPSULE | Refills: 0 | Status: SHIPPED | OUTPATIENT
Start: 2025-01-08 | End: 2025-01-18

## 2025-01-08 RX ORDER — ACETAMINOPHEN 500 MG
500 TABLET ORAL EVERY 6 HOURS PRN
Qty: 30 TABLET | Refills: 0 | Status: SHIPPED | OUTPATIENT
Start: 2025-01-08

## 2025-01-08 RX ADMIN — ACETAMINOPHEN 1000 MG: 500 TABLET, FILM COATED ORAL at 07:01

## 2025-01-08 RX ADMIN — HYDROXYZINE PAMOATE 25 MG: 25 CAPSULE ORAL at 08:01

## 2025-01-08 NOTE — Clinical Note
"Rachael Pro" Sajan was seen and treated in our emergency department on 1/8/2025.  She may return to work on 01/10/2025.       If you have any questions or concerns, please don't hesitate to call.      Lola Ortega, DO"

## 2025-01-08 NOTE — DISCHARGE INSTRUCTIONS
Please sign up for and monitor all results on [facility] patient portal.    Please return to the ED for any new, concerning symptoms, or worsening symptoms.    Please follow-up with your primary care provider within 1 day.  An ER visit can not replace the evaluation by your primary care physician.    In the emergency department it is our goal to rule out life-threatening conditions and make sure that you are safe to be discharged home.    Many medical conditions are difficult to diagnose and may be impossible to diagnosed during a single ER visit.  Many health conditions start with nonspecific symptoms and can only be diagnosed on follow-up visits with your primary care physician or specialist.    Please be aware that all medical conditions can change and we can not predict how you will be feeling tomorrow or the next day.   If you have any worsening or new symptoms you should not hesitate to return to the emergency department for re-evaluation.  Be sure to follow up with your primary care physician for recheck and review any labs or imaging that were performed today.  It is very common for us to identify non emergent incidental findings on labs and imaging which must be followed up with your primary care physician.   If you do not have a primary care physician, you may contact the 1 listed on your discharge paperwork or you can also call the [facility] appointment desk at [phone] to schedule an appointment.

## 2025-01-08 NOTE — ED PROVIDER NOTES
Encounter Date: 1/8/2025    SCRIBE #1 NOTE: I, Jad Espinoza Do, am scribing for, and in the presence of,  Lola Ortega DO. I have scribed the following portions of the note - Other sections scribed: HPI, ROS, PE.       History     Chief Complaint   Patient presents with    Headache     Pt c/o headache and sore throat x 2 days. Denies fever. Vomiting on yesterday. None today      34 y.o. female with a PMHx of anemia and asthma, who presents to the ED for chief complaint of URI concerns onset 2 days ago. Patient reports headache, sore throat, and chills. She endorses current birth control, reports her LMP was about 1 year ago. She denies any daily medications. She denies any attempted treatment PTA. No other exacerbating or alleviating factors. Patient denies any fever, cough, ear pain, or other associated symptoms.    Patient also complains of vaginal irritation and itching after a recent change in Dove soap. She reports previous Dove sensitive soap, however reports recently switching to a scented Dove soap. She endorses shaving her pubic mound prior to switching soaps. Patient denies any vaginal discharge or vaginal odor.     The history is provided by the patient. No  was used.     Review of patient's allergies indicates:  No Known Allergies  Past Medical History:   Diagnosis Date    Anemia     Arthritis     Asthma     Carpal tunnel syndrome     Complication of anesthesia     one sided epidural     Past Surgical History:   Procedure Laterality Date    TUBAL LIGATION      2013     Family History   Problem Relation Name Age of Onset    Hypertension Maternal Grandmother      Hyperlipidemia Maternal Grandmother      Breast cancer Maternal Grandmother          dx in age - unknown    Hypertension Mother      Asthma Sister      Asthma Son      Heart disease Maternal Aunt      Cancer Maternal Uncle      Colon cancer Maternal Uncle      Ovarian cancer Neg Hx       Social History     Tobacco Use    Smoking  status: Never    Smokeless tobacco: Never   Substance Use Topics    Alcohol use: Yes     Alcohol/week: 2.0 standard drinks of alcohol     Types: 2 Shots of liquor per week     Comment: social    Drug use: No     Review of Systems   Constitutional:  Positive for chills. Negative for fever.   HENT:  Positive for sore throat. Negative for ear pain and rhinorrhea.    Eyes:  Negative for redness.   Respiratory:  Negative for cough and shortness of breath.    Cardiovascular:  Negative for chest pain and leg swelling.   Gastrointestinal:  Negative for abdominal pain, diarrhea, nausea and vomiting.   Genitourinary:  Negative for vaginal discharge.        (+) Vaginal irritation  (-) Vaginal odor   Musculoskeletal:  Negative for back pain.   Skin:  Negative for rash.   Neurological:  Positive for headaches. Negative for syncope.   All other systems reviewed and are negative.      Physical Exam     Initial Vitals [01/08/25 0728]   BP Pulse Resp Temp SpO2   (!) 142/99 81 18 99 °F (37.2 °C) 100 %      MAP       --         Physical Exam    Constitutional: Vital signs are normal. She appears well-developed and well-nourished.   Patient gave consent to have physical exam performed.     HENT:   Head: Normocephalic and atraumatic.   Right Ear: External ear normal.   Left Ear: External ear normal.   Nose: Nose normal. Mouth/Throat: Oropharynx is clear and moist.   Eyes: Conjunctivae are normal.   Neck: Neck supple.   Normal range of motion.  Cardiovascular:  Normal rate, regular rhythm and normal heart sounds.     Exam reveals no gallop and no friction rub.       No murmur heard.  Pulmonary/Chest: Breath sounds normal. No respiratory distress. She has no wheezes. She has no rhonchi. She has no rales.   Abdominal: Abdomen is soft. Bowel sounds are normal. She exhibits no distension. There is no abdominal tenderness. There is no rebound and no guarding.   Genitourinary:    Genitourinary Comments: Patient refused pelvic exam and external  evaluation of  area.     Musculoskeletal:      Cervical back: Normal range of motion and neck supple.     Neurological: She is alert and oriented to person, place, and time.   Skin: Skin is warm and dry. Capillary refill takes less than 2 seconds. No rash noted.   Psychiatric: She has a normal mood and affect.         ED Course   Procedures  Labs Reviewed   POCT URINALYSIS W/O SCOPE - Abnormal       Result Value    Glucose, UA Negative      Bilirubin, UA Negative      Ketones, UA Negative      Spec Grav UA 1.025      Blood, UA Negative      PH, UA 6.0      Protein, UA 2+ (*)     Urobilinogen, UA 1.0      Nitrite, UA Negative      Leukocytes, UA 3+ (*)     Color, UA POC Yellow      Clarity, UA, POC Clear     CULTURE, URINE   POCT URINE PREGNANCY    POC Preg Test, Ur Negative       Acceptable Yes     SARS-COV-2 RDRP GENE    POC Rapid COVID Negative       Acceptable Yes     POCT STREP A, RAPID    POC Rapid Strep A negative     POCT RAPID INFLUENZA A/B    Influenza B Ag negative      Inflenza A Ag negative     POCT URINALYSIS W/O SCOPE          Imaging Results    None          Medications   acetaminophen tablet 1,000 mg (1,000 mg Oral Given 1/8/25 0759)   hydrOXYzine pamoate capsule 25 mg (25 mg Oral Given 1/8/25 0800)     Medical Decision Making  Amount and/or Complexity of Data Reviewed  Labs: ordered. Decision-making details documented in ED Course.    Risk  OTC drugs.  Prescription drug management.    Medical Decision Making:    This is an evaluation of a 34 y.o. female that presents to the Emergency Department for   Chief Complaint   Patient presents with    Headache     Pt c/o headache and sore throat x 2 days. Denies fever. Vomiting on yesterday. None today      The patient is a non-toxic and well appearing patient. On physical exam, patient appears well hydrated with moist mucus membranes. Breath sounds are clear and equal bilaterally with no adventitious breath sounds, tachypnea  or respiratory distress. Regular rate and rhythm. No murmurs. Abdomen soft and non tender. Patient is tolerating PO without difficulty. Physical exam otherwise as above.     I have reviewed vital signs and nursing notes.   Vital Signs Are Reassuring.     Based on the patient's symptoms, I am considering and evaluating for the following differential diagnoses: Flu, strep, COVID, viral illness, pregnancy.     ED Course:Treatment in the ED included Physical Exam and medications given in ED  Medications   acetaminophen tablet 1,000 mg (1,000 mg Oral Given 1/8/25 0759)   hydrOXYzine pamoate capsule 25 mg (25 mg Oral Given 1/8/25 0800)     Patient reports feeling better after treatment in the ER.     External Data/Documents Reviewed: Previous medical records and vital signs reviewed, see HPI and Physical exam.   Labs: ordered and reviewed.  UA positive for leukocytes.  Urine culture pending    Risk  Diagnosis or treatment significantly limited by the following social determinants of health: Body mass index is 37.77 kg/m².     In shared decision making with the patient, we discussed treatment, prescriptions, labs, and imaging results. Patient reports calling an [employer] to the ED, endorses a plan to call [employer] following discharge. Patient was offered a pelvic examination for evaluation of vaginal itching and irritation, however patient has declined the pelvic examination.     Discharge home with   ED Prescriptions       Medication Sig Dispense Start Date End Date Auth. Provider    acetaminophen (TYLENOL) 500 MG tablet Take 1 tablet (500 mg total) by mouth every 6 (six) hours as needed. 30 tablet 1/8/2025 -- Lola Ortega,     ibuprofen (ADVIL,MOTRIN) 600 MG tablet Take 1 tablet (600 mg total) by mouth every 6 (six) hours as needed for Pain (Take with food as needed for mild-to-moderate pain). 20 tablet 1/8/2025 -- Lola Ortega DO    sodium chloride (SALINE NASAL) 0.65 % nasal spray 1 spray by Nasal route as needed for  Congestion. 30 mL 1/8/2025 -- Lola Ortega DO    fluticasone propionate (FLONASE) 50 mcg/actuation nasal spray 1 spray (50 mcg total) by Each Nostril route 2 (two) times daily. 16 g 1/8/2025 -- Lola Ortega DO    benzonatate (TESSALON) 200 MG capsule Take 1 capsule (200 mg total) by mouth 3 (three) times daily as needed for Cough. 30 capsule 1/8/2025 1/18/2025 Lola Ortega DO    loratadine (CLARITIN) 10 mg tablet Take 1 tablet (10 mg total) by mouth once daily. 60 tablet 1/8/2025 1/8/2026 Lola Ortega DO    hydrOXYzine HCL (ATARAX) 25 MG tablet Take 1 tablet (25 mg total) by mouth 3 (three) times daily as needed for Itching. 12 tablet 1/8/2025 -- Lola Ortega DO    amoxicillin-clavulanate 875-125mg (AUGMENTIN) 875-125 mg per tablet Take 1 tablet by mouth 2 (two) times daily. 14 tablet 1/8/2025 -- Lola Ortega DO    phenazopyridine (PYRIDIUM) 200 MG tablet Take 1 tablet (200 mg total) by mouth 3 (three) times daily as needed for Pain (As needed for pain and discomfort with urination). 6 tablet 1/8/2025 1/18/2025 Lola Ortega DO    miconazole nitrate (MICONAZOLE-3) 200 mg Supp Place 1 suppository (200 mg total) vaginally every evening. for 3 days 3 suppository 1/8/2025 1/11/2025 Lola Ortega DO          Fill and take prescriptions as directed.  Return to the ED if symptoms worsen or do not resolve.   Answered questions and discussed discharge plan.    Patient reports resolution of symptoms and is ready for discharge.  Follow up with PCP/specialist in 1 day    The following labs and imaging were reviewed:    Admission on 01/08/2025, Discharged on 01/08/2025   Component Date Value Ref Range Status    POC Preg Test, Ur 01/08/2025 Negative  Negative Final     Acceptable 01/08/2025 Yes   Final    POC Rapid COVID 01/08/2025 Negative  Negative Final     Acceptable 01/08/2025 Yes   Final    POC Rapid Strep A 01/08/2025 negative  Positive/Negative Final    Influenza B Ag 01/08/2025  negative  Positive/Negative Final    Inflenza A Ag 01/08/2025 negative  Positive/Negative Final    Glucose, UA 01/08/2025 Negative  Negative Final    Bilirubin, UA 01/08/2025 Negative  Negative Final    Ketones, UA 01/08/2025 Negative  Negative Final    Spec Grav UA 01/08/2025 1.025  1.005 - 1.030 Final    Blood, UA 01/08/2025 Negative  Negative Final    PH, UA 01/08/2025 6.0  5.0 - 8.0 Final    Protein, UA 01/08/2025 2+ (A)  Negative Final    Urobilinogen, UA 01/08/2025 1.0  <=1.0 E.U./dL Final    Nitrite, UA 01/08/2025 Negative  Negative Final    Leukocytes, UA 01/08/2025 3+ (A)  Negative Final    Color, UA POC 01/08/2025 Yellow  Yellow, Straw, [place] Final    Clarity, UA, POC 01/08/2025 Clear  Clear Final        Imaging Results    None                                      I, Dr. Lola Ortega, personally performed the services described in this documentation. This document was produced by a scribe under my direction and in my presence. All medical record entries made by the scribe were at my direction and in my presence.  I have reviewed the chart and agree that the record reflects my personal performance and is accurate and complete. Lola Ortega DO.     01/08/2025 4:01 PM          Clinical Impression:  Final diagnoses:  [J06.9] Viral URI with cough (Primary)  [N39.0] Acute UTI          ED Disposition Condition    Discharge Stable          ED Prescriptions       Medication Sig Dispense Start Date End Date Auth. Provider    acetaminophen (TYLENOL) 500 MG tablet Take 1 tablet (500 mg total) by mouth every 6 (six) hours as needed. 30 tablet 1/8/2025 -- Lola Ortega DO    ibuprofen (ADVIL,MOTRIN) 600 MG tablet Take 1 tablet (600 mg total) by mouth every 6 (six) hours as needed for Pain (Take with food as needed for mild-to-moderate pain). 20 tablet 1/8/2025 -- Lola Ortega DO    sodium chloride (SALINE NASAL) 0.65 % nasal spray 1 spray by Nasal route as needed for Congestion. 30 mL 1/8/2025 -- Jordan  DO Lola    fluticasone propionate (FLONASE) 50 mcg/actuation nasal spray 1 spray (50 mcg total) by Each Nostril route 2 (two) times daily. 16 g 1/8/2025 -- Lola Ortega DO    benzonatate (TESSALON) 200 MG capsule Take 1 capsule (200 mg total) by mouth 3 (three) times daily as needed for Cough. 30 capsule 1/8/2025 1/18/2025 Lola Ortega DO    loratadine (CLARITIN) 10 mg tablet Take 1 tablet (10 mg total) by mouth once daily. 60 tablet 1/8/2025 1/8/2026 Lola Ortega DO    hydrOXYzine HCL (ATARAX) 25 MG tablet Take 1 tablet (25 mg total) by mouth 3 (three) times daily as needed for Itching. 12 tablet 1/8/2025 -- Lola Ortega DO    amoxicillin-clavulanate 875-125mg (AUGMENTIN) 875-125 mg per tablet Take 1 tablet by mouth 2 (two) times daily. 14 tablet 1/8/2025 -- Lola Ortega DO    phenazopyridine (PYRIDIUM) 200 MG tablet Take 1 tablet (200 mg total) by mouth 3 (three) times daily as needed for Pain (As needed for pain and discomfort with urination). 6 tablet 1/8/2025 1/18/2025 Lola Ortega DO    miconazole nitrate (MICONAZOLE-3) 200 mg Supp Place 1 suppository (200 mg total) vaginally every evening. for 3 days 3 suppository 1/8/2025 1/11/2025 Lola Ortega DO          Follow-up Information       Follow up With Specialties Details Why Contact Info Additional Information    Cony Lau FNP Family Medicine Schedule an appointment as soon as possible for a visit in 1 day  [address]  [place] [address]  [phone]       [facility] Emergency Dept Emergency Medicine Go to  Please go to [facility] emergency department if symptoms worsen [address]  [facility] [place] [address] [phone]     [facility] - OB GYN Obstetrics and Gynecology Schedule an appointment as soon as possible for a visit in 1 day For further care and evaluation of pelvic pain and cramping [address] [facility] [address] [phone] Please park in  garage or Medical Ofc Bldg surface lot and use Medical Office Bldg elevator. If you are here for a visit with your OB/GYN, please check in on the 3rd floor, [address].  If you are here for Fetal Monitoring with the nurse, please check in on the 2nd Floor, [address].             Lola Ortega, DO  01/08/25 2554

## 2025-01-10 LAB — BACTERIA UR CULT: NORMAL

## 2025-07-17 ENCOUNTER — HOSPITAL ENCOUNTER (EMERGENCY)
Facility: HOSPITAL | Age: 35
Discharge: HOME | End: 2025-07-17
Attending: EMERGENCY MEDICINE
Payer: MEDICAID

## 2025-07-17 VITALS
SYSTOLIC BLOOD PRESSURE: 138 MMHG | TEMPERATURE: 98 F | RESPIRATION RATE: 18 BRPM | DIASTOLIC BLOOD PRESSURE: 102 MMHG | WEIGHT: 238 LBS | HEIGHT: 65 IN | OXYGEN SATURATION: 99 % | HEART RATE: 65 BPM | BODY MASS INDEX: 39.65 KG/M2

## 2025-07-17 DIAGNOSIS — S61.309A AVULSION OF FINGERNAIL, INITIAL ENCOUNTER: ICD-10-CM
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
## 2025-07-17 DIAGNOSIS — S69.90XA INJURY TO FINGERNAIL, UNSPECIFIED LATERALITY, INITIAL ENCOUNTER: ICD-10-CM

## 2025-07-17 DIAGNOSIS — M25.571 ANKLE PAIN, RIGHT: ICD-10-CM

## 2025-07-17 LAB
B-HCG UR QL: NEGATIVE
CTP QC/QA: YES

## 2025-07-17 PROCEDURE — 25000003 PHARM REV CODE 250: Mod: ER

## 2025-07-17 PROCEDURE — 81025 URINE PREGNANCY TEST: CPT | Mod: ER,MUE

## 2025-07-17 PROCEDURE — 81025 URINE PREGNANCY TEST: CPT | Mod: ER

## 2025-07-17 PROCEDURE — 99284 EMERGENCY DEPT VISIT MOD MDM: CPT | Mod: 25,ER

## 2025-07-17 RX ORDER — ACETAMINOPHEN 500 MG
1000 TABLET ORAL
Status: COMPLETED | OUTPATIENT
Start: 2025-07-17 | End: 2025-07-17

## 2025-07-17 RX ORDER — AMOXICILLIN AND CLAVULANATE POTASSIUM 875; 125 MG/1; MG/1
1 TABLET, FILM COATED ORAL 2 TIMES DAILY
Qty: 14 TABLET | Refills: 0 | Status: SHIPPED | OUTPATIENT
Start: 2025-07-17

## 2025-07-17 RX ORDER — IBUPROFEN 400 MG/1
800 TABLET, FILM COATED ORAL
Status: COMPLETED | OUTPATIENT
Start: 2025-07-17 | End: 2025-07-17

## 2025-07-17 RX ORDER — IBUPROFEN 800 MG/1
800 TABLET, FILM COATED ORAL EVERY 6 HOURS PRN
Qty: 20 TABLET | Refills: 0 | Status: SHIPPED | OUTPATIENT
Start: 2025-07-17

## 2025-07-17 RX ORDER — MUPIROCIN 20 MG/G
OINTMENT TOPICAL 2 TIMES DAILY
Qty: 15 G | Refills: 0 | Status: SHIPPED | OUTPATIENT
Start: 2025-07-17

## 2025-07-17 RX ADMIN — IBUPROFEN 800 MG: 400 TABLET ORAL at 11:07

## 2025-07-17 RX ADMIN — ACETAMINOPHEN 1000 MG: 500 TABLET ORAL at 11:07
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
## 2025-07-17 NOTE — Clinical Note
"Rachael Mercerle" Sajan was seen and treated in our emergency department on 7/17/2025.  She may return to work on 07/21/2025.       If you have any questions or concerns, please don't hesitate to call.      Hortencia Nichole PA-C"

## 2025-07-17 NOTE — DISCHARGE INSTRUCTIONS
Complete full course of antibiotics as prescribed.  Please keep your wound clean and dry.  You may wash gently with soap and water and apply antibiotic ointment (bacitracin, neosporin, etc.) over the wound after washing.  Leave Surgicel in place and allow it to follow up on its own as this is acting as a protective skin layer.  You may wear finger splints to help protect from further damage or as support.  Please watch for signs of infection including: increased\spreading redness, swelling, pus-like discharge, or a fever greater than 100.4F. If you experience any of these, please contact your Primary Care Doctor or Return to the Emergency Department for a wound check.     Please follow up with your Primary Care Doctor in 3-5 days for wound recheck.
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
